# Patient Record
Sex: MALE | Race: BLACK OR AFRICAN AMERICAN | NOT HISPANIC OR LATINO | Employment: OTHER | URBAN - METROPOLITAN AREA
[De-identification: names, ages, dates, MRNs, and addresses within clinical notes are randomized per-mention and may not be internally consistent; named-entity substitution may affect disease eponyms.]

---

## 2019-01-01 ENCOUNTER — OUTSIDE PLACE OF SERVICE (OUTPATIENT)
Dept: ADMINISTRATIVE | Facility: HOSPITAL | Age: 84
End: 2019-01-01
Payer: MEDICARE

## 2019-01-01 ENCOUNTER — HOSPITAL ENCOUNTER (INPATIENT)
Facility: HOSPITAL | Age: 84
LOS: 5 days | Discharge: NURSING, REHAB OR LONG-TERM CARE | DRG: 682 | End: 2019-11-06
Attending: EMERGENCY MEDICINE | Admitting: INTERNAL MEDICINE
Payer: MEDICARE

## 2019-01-01 VITALS
DIASTOLIC BLOOD PRESSURE: 42 MMHG | SYSTOLIC BLOOD PRESSURE: 176 MMHG | SYSTOLIC BLOOD PRESSURE: 113 MMHG | HEART RATE: 77 BPM | HEART RATE: 88 BPM | DIASTOLIC BLOOD PRESSURE: 67 MMHG | RESPIRATION RATE: 14 BRPM | OXYGEN SATURATION: 97 %

## 2019-01-01 VITALS
RESPIRATION RATE: 16 BRPM | HEART RATE: 61 BPM | SYSTOLIC BLOOD PRESSURE: 153 MMHG | RESPIRATION RATE: 14 BRPM | HEART RATE: 87 BPM | HEART RATE: 68 BPM | OXYGEN SATURATION: 98 % | TEMPERATURE: 98 F | DIASTOLIC BLOOD PRESSURE: 55 MMHG | RESPIRATION RATE: 16 BRPM | TEMPERATURE: 98 F | HEART RATE: 72 BPM | SYSTOLIC BLOOD PRESSURE: 153 MMHG | RESPIRATION RATE: 17 BRPM | DIASTOLIC BLOOD PRESSURE: 59 MMHG | OXYGEN SATURATION: 99 % | HEART RATE: 95 BPM | TEMPERATURE: 98 F | OXYGEN SATURATION: 100 % | DIASTOLIC BLOOD PRESSURE: 59 MMHG | SYSTOLIC BLOOD PRESSURE: 140 MMHG | DIASTOLIC BLOOD PRESSURE: 53 MMHG | SYSTOLIC BLOOD PRESSURE: 140 MMHG | TEMPERATURE: 98 F | OXYGEN SATURATION: 100 % | SYSTOLIC BLOOD PRESSURE: 142 MMHG | DIASTOLIC BLOOD PRESSURE: 39 MMHG | OXYGEN SATURATION: 98 % | RESPIRATION RATE: 15 BRPM

## 2019-01-01 VITALS
RESPIRATION RATE: 19 BRPM | SYSTOLIC BLOOD PRESSURE: 122 MMHG | HEART RATE: 58 BPM | DIASTOLIC BLOOD PRESSURE: 51 MMHG | SYSTOLIC BLOOD PRESSURE: 116 MMHG | TEMPERATURE: 98 F | TEMPERATURE: 98 F | SYSTOLIC BLOOD PRESSURE: 167 MMHG | HEART RATE: 102 BPM | OXYGEN SATURATION: 100 % | OXYGEN SATURATION: 94 % | RESPIRATION RATE: 20 BRPM | DIASTOLIC BLOOD PRESSURE: 59 MMHG | RESPIRATION RATE: 15 BRPM | SYSTOLIC BLOOD PRESSURE: 132 MMHG | HEART RATE: 85 BPM | OXYGEN SATURATION: 87 % | RESPIRATION RATE: 18 BRPM | HEART RATE: 88 BPM | RESPIRATION RATE: 16 BRPM | SYSTOLIC BLOOD PRESSURE: 156 MMHG | TEMPERATURE: 98 F | DIASTOLIC BLOOD PRESSURE: 48 MMHG | TEMPERATURE: 98 F | DIASTOLIC BLOOD PRESSURE: 58 MMHG | HEART RATE: 118 BPM | DIASTOLIC BLOOD PRESSURE: 67 MMHG | OXYGEN SATURATION: 94 % | TEMPERATURE: 98 F

## 2019-01-01 VITALS
TEMPERATURE: 98 F | HEART RATE: 75 BPM | SYSTOLIC BLOOD PRESSURE: 131 MMHG | RESPIRATION RATE: 17 BRPM | DIASTOLIC BLOOD PRESSURE: 50 MMHG | DIASTOLIC BLOOD PRESSURE: 35 MMHG | HEART RATE: 73 BPM | HEART RATE: 88 BPM | SYSTOLIC BLOOD PRESSURE: 105 MMHG | OXYGEN SATURATION: 100 % | DIASTOLIC BLOOD PRESSURE: 47 MMHG | TEMPERATURE: 98 F | DIASTOLIC BLOOD PRESSURE: 61 MMHG | HEART RATE: 103 BPM | OXYGEN SATURATION: 100 % | OXYGEN SATURATION: 96 % | RESPIRATION RATE: 18 BRPM | SYSTOLIC BLOOD PRESSURE: 115 MMHG | SYSTOLIC BLOOD PRESSURE: 156 MMHG

## 2019-01-01 VITALS
OXYGEN SATURATION: 100 % | TEMPERATURE: 98 F | HEART RATE: 70 BPM | DIASTOLIC BLOOD PRESSURE: 42 MMHG | RESPIRATION RATE: 15 BRPM | OXYGEN SATURATION: 100 % | SYSTOLIC BLOOD PRESSURE: 118 MMHG | DIASTOLIC BLOOD PRESSURE: 42 MMHG | TEMPERATURE: 98 F | HEART RATE: 82 BPM | SYSTOLIC BLOOD PRESSURE: 116 MMHG | RESPIRATION RATE: 13 BRPM | TEMPERATURE: 98 F | OXYGEN SATURATION: 97 % | SYSTOLIC BLOOD PRESSURE: 133 MMHG | DIASTOLIC BLOOD PRESSURE: 46 MMHG | RESPIRATION RATE: 16 BRPM | HEART RATE: 86 BPM

## 2019-01-01 VITALS
TEMPERATURE: 98 F | RESPIRATION RATE: 17 BRPM | DIASTOLIC BLOOD PRESSURE: 53 MMHG | HEART RATE: 88 BPM | SYSTOLIC BLOOD PRESSURE: 150 MMHG | OXYGEN SATURATION: 99 %

## 2019-01-01 VITALS
OXYGEN SATURATION: 90 % | HEART RATE: 97 BPM | RESPIRATION RATE: 18 BRPM | TEMPERATURE: 98 F | DIASTOLIC BLOOD PRESSURE: 67 MMHG | BODY MASS INDEX: 25.05 KG/M2 | DIASTOLIC BLOOD PRESSURE: 54 MMHG | SYSTOLIC BLOOD PRESSURE: 115 MMHG | SYSTOLIC BLOOD PRESSURE: 145 MMHG | HEART RATE: 93 BPM | RESPIRATION RATE: 13 BRPM | TEMPERATURE: 98 F | HEIGHT: 72 IN | OXYGEN SATURATION: 100 % | WEIGHT: 184.94 LBS

## 2019-01-01 DIAGNOSIS — D63.1 ANEMIA DUE TO CHRONIC KIDNEY DISEASE, ON CHRONIC DIALYSIS: ICD-10-CM

## 2019-01-01 DIAGNOSIS — Z99.2 ANEMIA DUE TO CHRONIC KIDNEY DISEASE, ON CHRONIC DIALYSIS: ICD-10-CM

## 2019-01-01 DIAGNOSIS — Z99.2 ADMISSION FOR DIALYSIS: ICD-10-CM

## 2019-01-01 DIAGNOSIS — D64.9 ANEMIA: ICD-10-CM

## 2019-01-01 DIAGNOSIS — E87.5 HYPERKALEMIA: Primary | ICD-10-CM

## 2019-01-01 DIAGNOSIS — D64.9 SYMPTOMATIC ANEMIA: ICD-10-CM

## 2019-01-01 DIAGNOSIS — N18.6 ANEMIA DUE TO CHRONIC KIDNEY DISEASE, ON CHRONIC DIALYSIS: ICD-10-CM

## 2019-01-01 DIAGNOSIS — S72.92XA FEMUR FRACTURE, LEFT: ICD-10-CM

## 2019-01-01 DIAGNOSIS — R53.81 MALAISE: ICD-10-CM

## 2019-01-01 LAB
ABO + RH BLD: NORMAL
ALBUMIN SERPL BCP-MCNC: 2.4 G/DL (ref 3.5–5.2)
ALBUMIN SERPL BCP-MCNC: 2.5 G/DL (ref 3.5–5.2)
ALBUMIN SERPL BCP-MCNC: 2.5 G/DL (ref 3.5–5.2)
ALBUMIN SERPL BCP-MCNC: 2.6 G/DL (ref 3.5–5.2)
ALBUMIN SERPL BCP-MCNC: 2.6 G/DL (ref 3.5–5.2)
ALBUMIN SERPL BCP-MCNC: 2.9 G/DL (ref 3.5–5.2)
ALP SERPL-CCNC: 69 U/L (ref 55–135)
ALP SERPL-CCNC: 72 U/L (ref 55–135)
ALP SERPL-CCNC: 72 U/L (ref 55–135)
ALP SERPL-CCNC: 75 U/L (ref 55–135)
ALP SERPL-CCNC: 77 U/L (ref 55–135)
ALP SERPL-CCNC: 86 U/L (ref 55–135)
ALT SERPL W/O P-5'-P-CCNC: 10 U/L (ref 10–44)
ALT SERPL W/O P-5'-P-CCNC: 11 U/L (ref 10–44)
ALT SERPL W/O P-5'-P-CCNC: 11 U/L (ref 10–44)
ALT SERPL W/O P-5'-P-CCNC: 9 U/L (ref 10–44)
AMPHET+METHAMPHET UR QL: NEGATIVE
AMYLASE SERPL-CCNC: 98 U/L (ref 20–110)
ANION GAP SERPL CALC-SCNC: 10 MMOL/L (ref 8–16)
ANION GAP SERPL CALC-SCNC: 10 MMOL/L (ref 8–16)
ANION GAP SERPL CALC-SCNC: 11 MMOL/L (ref 8–16)
ANION GAP SERPL CALC-SCNC: 12 MMOL/L (ref 8–16)
ANION GAP SERPL CALC-SCNC: 13 MMOL/L (ref 8–16)
ANION GAP SERPL CALC-SCNC: 9 MMOL/L (ref 8–16)
AST SERPL-CCNC: 16 U/L (ref 10–40)
AST SERPL-CCNC: 17 U/L (ref 10–40)
AST SERPL-CCNC: 18 U/L (ref 10–40)
AST SERPL-CCNC: 18 U/L (ref 10–40)
AST SERPL-CCNC: 21 U/L (ref 10–40)
AST SERPL-CCNC: 22 U/L (ref 10–40)
BACTERIA #/AREA URNS HPF: NEGATIVE /HPF
BACTERIA BLD CULT: NORMAL
BARBITURATES UR QL SCN>200 NG/ML: NEGATIVE
BASOPHILS # BLD AUTO: 0.02 K/UL (ref 0–0.2)
BASOPHILS # BLD AUTO: 0.02 K/UL (ref 0–0.2)
BASOPHILS # BLD AUTO: 0.04 K/UL (ref 0–0.2)
BASOPHILS # BLD AUTO: 0.04 K/UL (ref 0–0.2)
BASOPHILS # BLD AUTO: 0.05 K/UL (ref 0–0.2)
BASOPHILS # BLD AUTO: 0.06 K/UL (ref 0–0.2)
BASOPHILS NFR BLD: 0.1 % (ref 0–1.9)
BASOPHILS NFR BLD: 0.2 % (ref 0–1.9)
BASOPHILS NFR BLD: 0.3 % (ref 0–1.9)
BASOPHILS NFR BLD: 0.3 % (ref 0–1.9)
BASOPHILS NFR BLD: 0.4 % (ref 0–1.9)
BASOPHILS NFR BLD: 0.5 % (ref 0–1.9)
BENZODIAZ UR QL SCN>200 NG/ML: NEGATIVE
BILIRUB SERPL-MCNC: 0.7 MG/DL (ref 0.1–1)
BILIRUB SERPL-MCNC: 0.8 MG/DL (ref 0.1–1)
BILIRUB SERPL-MCNC: 0.8 MG/DL (ref 0.1–1)
BILIRUB SERPL-MCNC: 0.9 MG/DL (ref 0.1–1)
BILIRUB SERPL-MCNC: 1.1 MG/DL (ref 0.1–1)
BILIRUB SERPL-MCNC: 1.2 MG/DL (ref 0.1–1)
BILIRUB UR QL STRIP: NEGATIVE
BLD GP AB SCN CELLS X3 SERPL QL: NORMAL
BLD PROD TYP BPU: NORMAL
BLD PROD TYP BPU: NORMAL
BLOOD UNIT EXPIRATION DATE: NORMAL
BLOOD UNIT EXPIRATION DATE: NORMAL
BLOOD UNIT TYPE CODE: 6200
BLOOD UNIT TYPE CODE: 6200
BLOOD UNIT TYPE: NORMAL
BLOOD UNIT TYPE: NORMAL
BNP SERPL-MCNC: 335 PG/ML (ref 0–99)
BUN SERPL-MCNC: 40 MG/DL (ref 10–30)
BUN SERPL-MCNC: 41 MG/DL (ref 10–30)
BUN SERPL-MCNC: 50 MG/DL (ref 10–30)
BUN SERPL-MCNC: 50 MG/DL (ref 10–30)
BUN SERPL-MCNC: 62 MG/DL (ref 10–30)
BUN SERPL-MCNC: 63 MG/DL (ref 10–30)
BZE UR QL SCN: NEGATIVE
CALCIUM SERPL-MCNC: 10.1 MG/DL (ref 8.7–10.5)
CALCIUM SERPL-MCNC: 10.2 MG/DL (ref 8.7–10.5)
CALCIUM SERPL-MCNC: 9.4 MG/DL (ref 8.7–10.5)
CALCIUM SERPL-MCNC: 9.7 MG/DL (ref 8.7–10.5)
CANNABINOIDS UR QL SCN: NEGATIVE
CHLORIDE SERPL-SCNC: 101 MMOL/L (ref 95–110)
CHLORIDE SERPL-SCNC: 93 MMOL/L (ref 95–110)
CHLORIDE SERPL-SCNC: 97 MMOL/L (ref 95–110)
CHLORIDE SERPL-SCNC: 97 MMOL/L (ref 95–110)
CHLORIDE SERPL-SCNC: 99 MMOL/L (ref 95–110)
CHLORIDE SERPL-SCNC: 99 MMOL/L (ref 95–110)
CHOLEST SERPL-MCNC: 169 MG/DL (ref 120–199)
CHOLEST/HDLC SERPL: 4.8 {RATIO} (ref 2–5)
CK MB SERPL-MCNC: 3.1 NG/ML (ref 0.1–6.5)
CK SERPL-CCNC: 83 U/L (ref 20–200)
CLARITY UR: CLEAR
CO2 SERPL-SCNC: 25 MMOL/L (ref 23–29)
CO2 SERPL-SCNC: 27 MMOL/L (ref 23–29)
CO2 SERPL-SCNC: 27 MMOL/L (ref 23–29)
CO2 SERPL-SCNC: 29 MMOL/L (ref 23–29)
CO2 SERPL-SCNC: 30 MMOL/L (ref 23–29)
CO2 SERPL-SCNC: 30 MMOL/L (ref 23–29)
CODING SYSTEM: NORMAL
CODING SYSTEM: NORMAL
COLOR UR: YELLOW
CREAT SERPL-MCNC: 6 MG/DL (ref 0.5–1.4)
CREAT SERPL-MCNC: 6.5 MG/DL (ref 0.5–1.4)
CREAT SERPL-MCNC: 7.3 MG/DL (ref 0.5–1.4)
CREAT SERPL-MCNC: 7.3 MG/DL (ref 0.5–1.4)
CREAT SERPL-MCNC: 8.4 MG/DL (ref 0.5–1.4)
CREAT SERPL-MCNC: 9 MG/DL (ref 0.5–1.4)
CREAT UR-MCNC: 70 MG/DL (ref 23–375)
DIFFERENTIAL METHOD: ABNORMAL
DISPENSE STATUS: NORMAL
DISPENSE STATUS: NORMAL
EOSINOPHIL # BLD AUTO: 0 K/UL (ref 0–0.5)
EOSINOPHIL # BLD AUTO: 0.2 K/UL (ref 0–0.5)
EOSINOPHIL # BLD AUTO: 0.2 K/UL (ref 0–0.5)
EOSINOPHIL # BLD AUTO: 0.3 K/UL (ref 0–0.5)
EOSINOPHIL # BLD AUTO: 0.3 K/UL (ref 0–0.5)
EOSINOPHIL # BLD AUTO: 0.4 K/UL (ref 0–0.5)
EOSINOPHIL NFR BLD: 0.2 % (ref 0–8)
EOSINOPHIL NFR BLD: 1.9 % (ref 0–8)
EOSINOPHIL NFR BLD: 2.4 % (ref 0–8)
EOSINOPHIL NFR BLD: 2.6 % (ref 0–8)
EOSINOPHIL NFR BLD: 2.7 % (ref 0–8)
EOSINOPHIL NFR BLD: 3 % (ref 0–8)
ERYTHROCYTE [DISTWIDTH] IN BLOOD BY AUTOMATED COUNT: 19.2 % (ref 11.5–14.5)
ERYTHROCYTE [DISTWIDTH] IN BLOOD BY AUTOMATED COUNT: 20 % (ref 11.5–14.5)
ERYTHROCYTE [DISTWIDTH] IN BLOOD BY AUTOMATED COUNT: 20.3 % (ref 11.5–14.5)
ERYTHROCYTE [DISTWIDTH] IN BLOOD BY AUTOMATED COUNT: 20.9 % (ref 11.5–14.5)
ERYTHROCYTE [DISTWIDTH] IN BLOOD BY AUTOMATED COUNT: 21.7 % (ref 11.5–14.5)
ERYTHROCYTE [DISTWIDTH] IN BLOOD BY AUTOMATED COUNT: 22.9 % (ref 11.5–14.5)
EST. GFR  (AFRICAN AMERICAN): 5.3 ML/MIN/1.73 M^2
EST. GFR  (AFRICAN AMERICAN): 5.7 ML/MIN/1.73 M^2
EST. GFR  (AFRICAN AMERICAN): 6.8 ML/MIN/1.73 M^2
EST. GFR  (AFRICAN AMERICAN): 6.8 ML/MIN/1.73 M^2
EST. GFR  (AFRICAN AMERICAN): 7.8 ML/MIN/1.73 M^2
EST. GFR  (AFRICAN AMERICAN): 8.6 ML/MIN/1.73 M^2
EST. GFR  (NON AFRICAN AMERICAN): 4.6 ML/MIN/1.73 M^2
EST. GFR  (NON AFRICAN AMERICAN): 5 ML/MIN/1.73 M^2
EST. GFR  (NON AFRICAN AMERICAN): 5.9 ML/MIN/1.73 M^2
EST. GFR  (NON AFRICAN AMERICAN): 5.9 ML/MIN/1.73 M^2
EST. GFR  (NON AFRICAN AMERICAN): 6.8 ML/MIN/1.73 M^2
EST. GFR  (NON AFRICAN AMERICAN): 7.5 ML/MIN/1.73 M^2
GLUCOSE SERPL-MCNC: 102 MG/DL (ref 70–110)
GLUCOSE SERPL-MCNC: 102 MG/DL (ref 70–110)
GLUCOSE SERPL-MCNC: 103 MG/DL (ref 70–110)
GLUCOSE SERPL-MCNC: 107 MG/DL (ref 70–110)
GLUCOSE SERPL-MCNC: 114 MG/DL (ref 70–110)
GLUCOSE SERPL-MCNC: 92 MG/DL (ref 70–110)
GLUCOSE UR QL STRIP: NEGATIVE
HCT VFR BLD AUTO: 20.8 % (ref 40–54)
HCT VFR BLD AUTO: 27.1 % (ref 40–54)
HCT VFR BLD AUTO: 27.5 % (ref 40–54)
HCT VFR BLD AUTO: 29.3 % (ref 40–54)
HCT VFR BLD AUTO: 29.5 % (ref 40–54)
HCT VFR BLD AUTO: 33.5 % (ref 40–54)
HDLC SERPL-MCNC: 35 MG/DL (ref 40–75)
HDLC SERPL: 20.7 % (ref 20–50)
HGB BLD-MCNC: 10.4 G/DL (ref 14–18)
HGB BLD-MCNC: 6.2 G/DL (ref 14–18)
HGB BLD-MCNC: 8.4 G/DL (ref 14–18)
HGB BLD-MCNC: 8.4 G/DL (ref 14–18)
HGB BLD-MCNC: 9 G/DL (ref 14–18)
HGB BLD-MCNC: 9.1 G/DL (ref 14–18)
HGB UR QL STRIP: ABNORMAL
HYALINE CASTS #/AREA URNS LPF: 3 /LPF
IMM GRANULOCYTES # BLD AUTO: 0.05 K/UL (ref 0–0.04)
IMM GRANULOCYTES # BLD AUTO: 0.06 K/UL (ref 0–0.04)
IMM GRANULOCYTES # BLD AUTO: 0.07 K/UL (ref 0–0.04)
IMM GRANULOCYTES # BLD AUTO: 0.1 K/UL (ref 0–0.04)
IMM GRANULOCYTES NFR BLD AUTO: 0.4 % (ref 0–0.5)
IMM GRANULOCYTES NFR BLD AUTO: 0.5 % (ref 0–0.5)
IMM GRANULOCYTES NFR BLD AUTO: 0.6 % (ref 0–0.5)
IMM GRANULOCYTES NFR BLD AUTO: 0.7 % (ref 0–0.5)
KETONES UR QL STRIP: NEGATIVE
LDLC SERPL CALC-MCNC: 100.8 MG/DL (ref 63–159)
LEUKOCYTE ESTERASE UR QL STRIP: NEGATIVE
LIPASE SERPL-CCNC: 27 U/L (ref 4–60)
LYMPHOCYTES # BLD AUTO: 1.2 K/UL (ref 1–4.8)
LYMPHOCYTES # BLD AUTO: 1.4 K/UL (ref 1–4.8)
LYMPHOCYTES # BLD AUTO: 1.4 K/UL (ref 1–4.8)
LYMPHOCYTES # BLD AUTO: 1.5 K/UL (ref 1–4.8)
LYMPHOCYTES # BLD AUTO: 1.6 K/UL (ref 1–4.8)
LYMPHOCYTES # BLD AUTO: 1.8 K/UL (ref 1–4.8)
LYMPHOCYTES NFR BLD: 11.2 % (ref 18–48)
LYMPHOCYTES NFR BLD: 11.9 % (ref 18–48)
LYMPHOCYTES NFR BLD: 12.1 % (ref 18–48)
LYMPHOCYTES NFR BLD: 14.8 % (ref 18–48)
LYMPHOCYTES NFR BLD: 14.8 % (ref 18–48)
LYMPHOCYTES NFR BLD: 8.4 % (ref 18–48)
MAGNESIUM SERPL-MCNC: 2.1 MG/DL (ref 1.6–2.6)
MAGNESIUM SERPL-MCNC: 2.2 MG/DL (ref 1.6–2.6)
MAGNESIUM SERPL-MCNC: 2.4 MG/DL (ref 1.6–2.6)
MAGNESIUM SERPL-MCNC: 2.6 MG/DL (ref 1.6–2.6)
MCH RBC QN AUTO: 31.6 PG (ref 27–31)
MCH RBC QN AUTO: 31.9 PG (ref 27–31)
MCH RBC QN AUTO: 32 PG (ref 27–31)
MCH RBC QN AUTO: 32.2 PG (ref 27–31)
MCH RBC QN AUTO: 32.2 PG (ref 27–31)
MCH RBC QN AUTO: 33.5 PG (ref 27–31)
MCHC RBC AUTO-ENTMCNC: 29.8 G/DL (ref 32–36)
MCHC RBC AUTO-ENTMCNC: 30.5 G/DL (ref 32–36)
MCHC RBC AUTO-ENTMCNC: 30.7 G/DL (ref 32–36)
MCHC RBC AUTO-ENTMCNC: 30.8 G/DL (ref 32–36)
MCHC RBC AUTO-ENTMCNC: 31 G/DL (ref 32–36)
MCHC RBC AUTO-ENTMCNC: 31 G/DL (ref 32–36)
MCV RBC AUTO: 103 FL (ref 82–98)
MCV RBC AUTO: 104 FL (ref 82–98)
MCV RBC AUTO: 112 FL (ref 82–98)
MICROSCOPIC COMMENT: ABNORMAL
MONOCYTES # BLD AUTO: 0.6 K/UL (ref 0.3–1)
MONOCYTES # BLD AUTO: 1 K/UL (ref 0.3–1)
MONOCYTES # BLD AUTO: 1.1 K/UL (ref 0.3–1)
MONOCYTES # BLD AUTO: 1.1 K/UL (ref 0.3–1)
MONOCYTES # BLD AUTO: 1.3 K/UL (ref 0.3–1)
MONOCYTES # BLD AUTO: 1.3 K/UL (ref 0.3–1)
MONOCYTES NFR BLD: 10.8 % (ref 4–15)
MONOCYTES NFR BLD: 11 % (ref 4–15)
MONOCYTES NFR BLD: 6.9 % (ref 4–15)
MONOCYTES NFR BLD: 7.4 % (ref 4–15)
MONOCYTES NFR BLD: 8.2 % (ref 4–15)
MONOCYTES NFR BLD: 8.2 % (ref 4–15)
NEUTROPHILS # BLD AUTO: 10.5 K/UL (ref 1.8–7.7)
NEUTROPHILS # BLD AUTO: 13.6 K/UL (ref 1.8–7.7)
NEUTROPHILS # BLD AUTO: 6.1 K/UL (ref 1.8–7.7)
NEUTROPHILS # BLD AUTO: 8.4 K/UL (ref 1.8–7.7)
NEUTROPHILS # BLD AUTO: 9.1 K/UL (ref 1.8–7.7)
NEUTROPHILS # BLD AUTO: 9.2 K/UL (ref 1.8–7.7)
NEUTROPHILS NFR BLD: 71.3 % (ref 38–73)
NEUTROPHILS NFR BLD: 74.2 % (ref 38–73)
NEUTROPHILS NFR BLD: 75 % (ref 38–73)
NEUTROPHILS NFR BLD: 76 % (ref 38–73)
NEUTROPHILS NFR BLD: 76 % (ref 38–73)
NEUTROPHILS NFR BLD: 83.8 % (ref 38–73)
NITRITE UR QL STRIP: NEGATIVE
NONHDLC SERPL-MCNC: 134 MG/DL
NRBC BLD-RTO: 0 /100 WBC
NUM UNITS TRANS PACKED RBC: NORMAL
NUM UNITS TRANS PACKED RBC: NORMAL
OPIATES UR QL SCN: NORMAL
PCP UR QL SCN>25 NG/ML: NEGATIVE
PH UR STRIP: 8 [PH] (ref 5–8)
PHOSPHATE SERPL-MCNC: 4.1 MG/DL (ref 2.7–4.5)
PHOSPHATE SERPL-MCNC: 4.1 MG/DL (ref 2.7–4.5)
PHOSPHATE SERPL-MCNC: 4.2 MG/DL (ref 2.7–4.5)
PHOSPHATE SERPL-MCNC: 4.5 MG/DL (ref 2.7–4.5)
PHOSPHATE SERPL-MCNC: 4.5 MG/DL (ref 2.7–4.5)
PLATELET # BLD AUTO: 307 K/UL (ref 150–350)
PLATELET # BLD AUTO: 319 K/UL (ref 150–350)
PLATELET # BLD AUTO: 324 K/UL (ref 150–350)
PLATELET # BLD AUTO: 337 K/UL (ref 150–350)
PLATELET # BLD AUTO: 390 K/UL (ref 150–350)
PLATELET # BLD AUTO: 492 K/UL (ref 150–350)
PMV BLD AUTO: 8.8 FL (ref 9.2–12.9)
PMV BLD AUTO: 9 FL (ref 9.2–12.9)
PMV BLD AUTO: 9.3 FL (ref 9.2–12.9)
PMV BLD AUTO: 9.5 FL (ref 9.2–12.9)
POTASSIUM SERPL-SCNC: 4.6 MMOL/L (ref 3.5–5.1)
POTASSIUM SERPL-SCNC: 4.6 MMOL/L (ref 3.5–5.1)
POTASSIUM SERPL-SCNC: 4.7 MMOL/L (ref 3.5–5.1)
POTASSIUM SERPL-SCNC: 4.9 MMOL/L (ref 3.5–5.1)
POTASSIUM SERPL-SCNC: 5.1 MMOL/L (ref 3.5–5.1)
POTASSIUM SERPL-SCNC: 5.8 MMOL/L (ref 3.5–5.1)
PROT SERPL-MCNC: 6.4 G/DL (ref 6–8.4)
PROT SERPL-MCNC: 6.5 G/DL (ref 6–8.4)
PROT SERPL-MCNC: 6.6 G/DL (ref 6–8.4)
PROT SERPL-MCNC: 6.7 G/DL (ref 6–8.4)
PROT SERPL-MCNC: 6.7 G/DL (ref 6–8.4)
PROT SERPL-MCNC: 7.5 G/DL (ref 6–8.4)
PROT UR QL STRIP: ABNORMAL
RBC # BLD AUTO: 1.85 M/UL (ref 4.6–6.2)
RBC # BLD AUTO: 2.61 M/UL (ref 4.6–6.2)
RBC # BLD AUTO: 2.66 M/UL (ref 4.6–6.2)
RBC # BLD AUTO: 2.81 M/UL (ref 4.6–6.2)
RBC # BLD AUTO: 2.85 M/UL (ref 4.6–6.2)
RBC # BLD AUTO: 3.23 M/UL (ref 4.6–6.2)
RBC #/AREA URNS HPF: 1 /HPF (ref 0–4)
SODIUM SERPL-SCNC: 131 MMOL/L (ref 136–145)
SODIUM SERPL-SCNC: 136 MMOL/L (ref 136–145)
SODIUM SERPL-SCNC: 137 MMOL/L (ref 136–145)
SODIUM SERPL-SCNC: 137 MMOL/L (ref 136–145)
SODIUM SERPL-SCNC: 139 MMOL/L (ref 136–145)
SODIUM SERPL-SCNC: 139 MMOL/L (ref 136–145)
SP GR UR STRIP: 1 (ref 1–1.03)
SQUAMOUS #/AREA URNS HPF: 1 /HPF
TOXICOLOGY INFORMATION: NORMAL
TRIGL SERPL-MCNC: 166 MG/DL (ref 30–150)
TROPONIN I SERPL DL<=0.01 NG/ML-MCNC: 0.08 NG/ML (ref 0.02–0.04)
TROPONIN I SERPL DL<=0.01 NG/ML-MCNC: 0.09 NG/ML (ref 0.02–0.04)
TSH SERPL DL<=0.005 MIU/L-ACNC: 2.21 UIU/ML (ref 0.34–5.6)
URN SPEC COLLECT METH UR: ABNORMAL
UROBILINOGEN UR STRIP-ACNC: NEGATIVE EU/DL
WBC # BLD AUTO: 11.79 K/UL (ref 3.9–12.7)
WBC # BLD AUTO: 12.02 K/UL (ref 3.9–12.7)
WBC # BLD AUTO: 12.18 K/UL (ref 3.9–12.7)
WBC # BLD AUTO: 13.83 K/UL (ref 3.9–12.7)
WBC # BLD AUTO: 16.19 K/UL (ref 3.9–12.7)
WBC # BLD AUTO: 8.19 K/UL (ref 3.9–12.7)
WBC #/AREA URNS HPF: 2 /HPF (ref 0–5)

## 2019-01-01 PROCEDURE — 94640 AIRWAY INHALATION TREATMENT: CPT

## 2019-01-01 PROCEDURE — 94760 N-INVAS EAR/PLS OXIMETRY 1: CPT

## 2019-01-01 PROCEDURE — 80053 COMPREHEN METABOLIC PANEL: CPT

## 2019-01-01 PROCEDURE — 83690 ASSAY OF LIPASE: CPT

## 2019-01-01 PROCEDURE — 94761 N-INVAS EAR/PLS OXIMETRY MLT: CPT

## 2019-01-01 PROCEDURE — 94664 DEMO&/EVAL PT USE INHALER: CPT

## 2019-01-01 PROCEDURE — 97530 THERAPEUTIC ACTIVITIES: CPT

## 2019-01-01 PROCEDURE — 63600175 PHARM REV CODE 636 W HCPCS: Performed by: INTERNAL MEDICINE

## 2019-01-01 PROCEDURE — 83735 ASSAY OF MAGNESIUM: CPT

## 2019-01-01 PROCEDURE — 25000003 PHARM REV CODE 250: Performed by: INTERNAL MEDICINE

## 2019-01-01 PROCEDURE — 81001 URINALYSIS AUTO W/SCOPE: CPT

## 2019-01-01 PROCEDURE — 25000242 PHARM REV CODE 250 ALT 637 W/ HCPCS: Performed by: EMERGENCY MEDICINE

## 2019-01-01 PROCEDURE — 96365 THER/PROPH/DIAG IV INF INIT: CPT

## 2019-01-01 PROCEDURE — 63600175 PHARM REV CODE 636 W HCPCS: Performed by: EMERGENCY MEDICINE

## 2019-01-01 PROCEDURE — 87040 BLOOD CULTURE FOR BACTERIA: CPT

## 2019-01-01 PROCEDURE — 80061 LIPID PANEL: CPT

## 2019-01-01 PROCEDURE — 36415 COLL VENOUS BLD VENIPUNCTURE: CPT

## 2019-01-01 PROCEDURE — 85025 COMPLETE CBC W/AUTO DIFF WBC: CPT

## 2019-01-01 PROCEDURE — 36430 TRANSFUSION BLD/BLD COMPNT: CPT

## 2019-01-01 PROCEDURE — 80307 DRUG TEST PRSMV CHEM ANLYZR: CPT

## 2019-01-01 PROCEDURE — 25000003 PHARM REV CODE 250: Performed by: NURSE PRACTITIONER

## 2019-01-01 PROCEDURE — P9016 RBC LEUKOCYTES REDUCED: HCPCS

## 2019-01-01 PROCEDURE — 25000003 PHARM REV CODE 250: Performed by: EMERGENCY MEDICINE

## 2019-01-01 PROCEDURE — 84443 ASSAY THYROID STIM HORMONE: CPT

## 2019-01-01 PROCEDURE — 82553 CREATINE MB FRACTION: CPT

## 2019-01-01 PROCEDURE — 82150 ASSAY OF AMYLASE: CPT

## 2019-01-01 PROCEDURE — 97110 THERAPEUTIC EXERCISES: CPT

## 2019-01-01 PROCEDURE — 84100 ASSAY OF PHOSPHORUS: CPT

## 2019-01-01 PROCEDURE — 90935 HEMODIALYSIS ONE EVALUATION: CPT

## 2019-01-01 PROCEDURE — 83880 ASSAY OF NATRIURETIC PEPTIDE: CPT

## 2019-01-01 PROCEDURE — 99900035 HC TECH TIME PER 15 MIN (STAT)

## 2019-01-01 PROCEDURE — 12000002 HC ACUTE/MED SURGE SEMI-PRIVATE ROOM

## 2019-01-01 PROCEDURE — 82550 ASSAY OF CK (CPK): CPT

## 2019-01-01 PROCEDURE — 97163 PT EVAL HIGH COMPLEX 45 MIN: CPT

## 2019-01-01 PROCEDURE — 96375 TX/PRO/DX INJ NEW DRUG ADDON: CPT

## 2019-01-01 PROCEDURE — 86920 COMPATIBILITY TEST SPIN: CPT

## 2019-01-01 PROCEDURE — 63600175 PHARM REV CODE 636 W HCPCS: Performed by: NURSE PRACTITIONER

## 2019-01-01 PROCEDURE — 99306 1ST NF CARE HIGH MDM 50: CPT | Mod: ,,, | Performed by: FAMILY MEDICINE

## 2019-01-01 PROCEDURE — 99306 PR NURSING FACILITY CARE, INIT, HIGH SEVERITY: ICD-10-PCS | Mod: ,,, | Performed by: FAMILY MEDICINE

## 2019-01-01 PROCEDURE — 97166 OT EVAL MOD COMPLEX 45 MIN: CPT

## 2019-01-01 PROCEDURE — 99285 EMERGENCY DEPT VISIT HI MDM: CPT | Mod: 25

## 2019-01-01 PROCEDURE — 86850 RBC ANTIBODY SCREEN: CPT

## 2019-01-01 PROCEDURE — 93005 ELECTROCARDIOGRAM TRACING: CPT

## 2019-01-01 PROCEDURE — 25000003 PHARM REV CODE 250: Performed by: STUDENT IN AN ORGANIZED HEALTH CARE EDUCATION/TRAINING PROGRAM

## 2019-01-01 PROCEDURE — 84484 ASSAY OF TROPONIN QUANT: CPT

## 2019-01-01 RX ORDER — OXYCODONE AND ACETAMINOPHEN 10; 325 MG/1; MG/1
1 TABLET ORAL
Status: DISCONTINUED | OUTPATIENT
Start: 2019-01-01 | End: 2019-01-01 | Stop reason: HOSPADM

## 2019-01-01 RX ORDER — CEPHALEXIN 250 MG/1
500 CAPSULE ORAL EVERY 12 HOURS
Status: DISCONTINUED | OUTPATIENT
Start: 2019-01-01 | End: 2019-01-01 | Stop reason: HOSPADM

## 2019-01-01 RX ORDER — KETOROLAC TROMETHAMINE 30 MG/ML
15 INJECTION, SOLUTION INTRAMUSCULAR; INTRAVENOUS ONCE
Status: COMPLETED | OUTPATIENT
Start: 2019-01-01 | End: 2019-01-01

## 2019-01-01 RX ORDER — HEPARIN SODIUM 5000 [USP'U]/ML
5000 INJECTION, SOLUTION INTRAVENOUS; SUBCUTANEOUS EVERY 8 HOURS
Status: DISCONTINUED | OUTPATIENT
Start: 2019-01-01 | End: 2019-01-01 | Stop reason: HOSPADM

## 2019-01-01 RX ORDER — KETOROLAC TROMETHAMINE 30 MG/ML
15 INJECTION, SOLUTION INTRAMUSCULAR; INTRAVENOUS ONCE
Status: DISCONTINUED | OUTPATIENT
Start: 2019-01-01 | End: 2019-01-01

## 2019-01-01 RX ORDER — L. ACIDOPHILUS/L.BULGARICUS 100MM CELL
1 GRANULES IN PACKET (EA) ORAL DAILY
Status: DISCONTINUED | OUTPATIENT
Start: 2019-01-01 | End: 2019-01-01 | Stop reason: HOSPADM

## 2019-01-01 RX ORDER — OXYCODONE AND ACETAMINOPHEN 10; 325 MG/1; MG/1
1 TABLET ORAL
COMMUNITY

## 2019-01-01 RX ORDER — HYDROCODONE BITARTRATE AND ACETAMINOPHEN 500; 5 MG/1; MG/1
TABLET ORAL
Status: DISCONTINUED | OUTPATIENT
Start: 2019-01-01 | End: 2019-01-01 | Stop reason: HOSPADM

## 2019-01-01 RX ORDER — L. ACIDOPHILUS/L.BULGARICUS 100MM CELL
1 GRANULES IN PACKET (EA) ORAL DAILY
Start: 2019-01-01

## 2019-01-01 RX ORDER — FAMOTIDINE 20 MG/1
20 TABLET, FILM COATED ORAL DAILY
Status: DISCONTINUED | OUTPATIENT
Start: 2019-01-01 | End: 2019-01-01 | Stop reason: HOSPADM

## 2019-01-01 RX ORDER — CELECOXIB 100 MG/1
200 CAPSULE ORAL DAILY
Status: DISCONTINUED | OUTPATIENT
Start: 2019-01-01 | End: 2019-01-01 | Stop reason: HOSPADM

## 2019-01-01 RX ORDER — AMOXICILLIN 250 MG
1 CAPSULE ORAL 2 TIMES DAILY
Status: DISCONTINUED | OUTPATIENT
Start: 2019-01-01 | End: 2019-01-01 | Stop reason: HOSPADM

## 2019-01-01 RX ORDER — ONDANSETRON 2 MG/ML
4 INJECTION INTRAMUSCULAR; INTRAVENOUS EVERY 8 HOURS PRN
Status: DISCONTINUED | OUTPATIENT
Start: 2019-01-01 | End: 2019-01-01 | Stop reason: HOSPADM

## 2019-01-01 RX ORDER — SEVELAMER CARBONATE FOR ORAL SUSPENSION 800 MG/1
2.4 POWDER, FOR SUSPENSION ORAL
Status: DISCONTINUED | OUTPATIENT
Start: 2019-01-01 | End: 2019-01-01 | Stop reason: HOSPADM

## 2019-01-01 RX ORDER — ACETAMINOPHEN 325 MG/1
650 TABLET ORAL EVERY 6 HOURS PRN
Status: DISCONTINUED | OUTPATIENT
Start: 2019-01-01 | End: 2019-01-01

## 2019-01-01 RX ORDER — ALLOPURINOL 100 MG/1
100 TABLET ORAL DAILY
Status: DISCONTINUED | OUTPATIENT
Start: 2019-01-01 | End: 2019-01-01 | Stop reason: HOSPADM

## 2019-01-01 RX ORDER — SODIUM CHLORIDE 0.9 % (FLUSH) 0.9 %
10 SYRINGE (ML) INJECTION
Status: DISCONTINUED | OUTPATIENT
Start: 2019-01-01 | End: 2019-01-01 | Stop reason: HOSPADM

## 2019-01-01 RX ORDER — LEVALBUTEROL INHALATION SOLUTION 0.63 MG/3ML
0.63 SOLUTION RESPIRATORY (INHALATION)
Status: COMPLETED | OUTPATIENT
Start: 2019-01-01 | End: 2019-01-01

## 2019-01-01 RX ORDER — ACETAMINOPHEN 325 MG/1
650 TABLET ORAL EVERY 4 HOURS PRN
Status: DISCONTINUED | OUTPATIENT
Start: 2019-01-01 | End: 2019-01-01 | Stop reason: HOSPADM

## 2019-01-01 RX ORDER — ZINC OXIDE 20 G/100G
OINTMENT TOPICAL 2 TIMES DAILY
Status: DISCONTINUED | OUTPATIENT
Start: 2019-01-01 | End: 2019-01-01 | Stop reason: HOSPADM

## 2019-01-01 RX ORDER — CELECOXIB 100 MG/1
400 CAPSULE ORAL ONCE
Status: COMPLETED | OUTPATIENT
Start: 2019-01-01 | End: 2019-01-01

## 2019-01-01 RX ORDER — INDOMETHACIN 25 MG/1
50 CAPSULE ORAL
Status: COMPLETED | OUTPATIENT
Start: 2019-01-01 | End: 2019-01-01

## 2019-01-01 RX ORDER — ONDANSETRON 4 MG/1
8 TABLET, ORALLY DISINTEGRATING ORAL EVERY 8 HOURS PRN
Status: DISCONTINUED | OUTPATIENT
Start: 2019-01-01 | End: 2019-01-01 | Stop reason: HOSPADM

## 2019-01-01 RX ORDER — MELOXICAM 15 MG/1
15 TABLET ORAL DAILY
Status: ON HOLD | COMMUNITY
End: 2019-01-01 | Stop reason: HOSPADM

## 2019-01-01 RX ORDER — OXYCODONE HYDROCHLORIDE 5 MG/1
5 TABLET ORAL EVERY 4 HOURS PRN
Status: DISCONTINUED | OUTPATIENT
Start: 2019-01-01 | End: 2019-01-01 | Stop reason: HOSPADM

## 2019-01-01 RX ADMIN — THERA TABS 1 TABLET: TAB at 08:11

## 2019-01-01 RX ADMIN — OXYCODONE HYDROCHLORIDE 5 MG: 5 TABLET ORAL at 11:11

## 2019-01-01 RX ADMIN — HEPARIN SODIUM 5000 UNITS: 5000 INJECTION INTRAVENOUS; SUBCUTANEOUS at 08:11

## 2019-01-01 RX ADMIN — ONDANSETRON 4 MG: 2 INJECTION INTRAMUSCULAR; INTRAVENOUS at 07:11

## 2019-01-01 RX ADMIN — CEPHALEXIN 500 MG: 250 CAPSULE ORAL at 08:11

## 2019-01-01 RX ADMIN — HEPARIN SODIUM 5000 UNITS: 5000 INJECTION INTRAVENOUS; SUBCUTANEOUS at 02:11

## 2019-01-01 RX ADMIN — ACETAMINOPHEN 650 MG: 325 TABLET ORAL at 05:11

## 2019-01-01 RX ADMIN — OXYCODONE HYDROCHLORIDE AND ACETAMINOPHEN 1 TABLET: 10; 325 TABLET ORAL at 08:11

## 2019-01-01 RX ADMIN — FAMOTIDINE 20 MG: 20 TABLET, FILM COATED ORAL at 08:11

## 2019-01-01 RX ADMIN — ONDANSETRON 4 MG: 2 INJECTION INTRAMUSCULAR; INTRAVENOUS at 08:11

## 2019-01-01 RX ADMIN — OXYCODONE HYDROCHLORIDE 5 MG: 5 TABLET ORAL at 01:11

## 2019-01-01 RX ADMIN — FAMOTIDINE 20 MG: 20 TABLET, FILM COATED ORAL at 09:11

## 2019-01-01 RX ADMIN — HUMAN INSULIN 10 UNITS: 100 INJECTION, SOLUTION SUBCUTANEOUS at 04:11

## 2019-01-01 RX ADMIN — CELECOXIB 200 MG: 100 CAPSULE ORAL at 10:11

## 2019-01-01 RX ADMIN — OXYCODONE HYDROCHLORIDE 5 MG: 5 TABLET ORAL at 12:11

## 2019-01-01 RX ADMIN — SEVELAMER CARBONATE 2.4 G: 800 POWDER, FOR SUSPENSION ORAL at 08:11

## 2019-01-01 RX ADMIN — KETOROLAC TROMETHAMINE 15 MG: 30 INJECTION, SOLUTION INTRAMUSCULAR; INTRAVENOUS at 06:11

## 2019-01-01 RX ADMIN — OXYCODONE HYDROCHLORIDE 5 MG: 5 TABLET ORAL at 09:11

## 2019-01-01 RX ADMIN — ALLOPURINOL 100 MG: 100 TABLET ORAL at 08:11

## 2019-01-01 RX ADMIN — ZINC OXIDE: 200 OINTMENT TOPICAL at 09:11

## 2019-01-01 RX ADMIN — LACTOBACILLUS ACIDOPHILUS / LACTOBACILLUS BULGARICUS 1 EACH: 100 MILLION CFU STRENGTH GRANULES at 09:11

## 2019-01-01 RX ADMIN — PATIROMER 8.4 G: 8.4 POWDER, FOR SUSPENSION ORAL at 05:11

## 2019-01-01 RX ADMIN — DEXTROSE 25 G: 50 INJECTION, SOLUTION INTRAVENOUS at 04:11

## 2019-01-01 RX ADMIN — ZINC OXIDE: 200 OINTMENT TOPICAL at 08:11

## 2019-01-01 RX ADMIN — OXYCODONE HYDROCHLORIDE 5 MG: 5 TABLET ORAL at 10:11

## 2019-01-01 RX ADMIN — EPOETIN ALFA-EPBX 8400 UNITS: 10000 INJECTION, SOLUTION INTRAVENOUS; SUBCUTANEOUS at 09:11

## 2019-01-01 RX ADMIN — HEPARIN SODIUM 5000 UNITS: 5000 INJECTION INTRAVENOUS; SUBCUTANEOUS at 10:11

## 2019-01-01 RX ADMIN — SEVELAMER CARBONATE 2.4 G: 800 POWDER, FOR SUSPENSION ORAL at 12:11

## 2019-01-01 RX ADMIN — SEVELAMER CARBONATE 2.4 G: 800 POWDER, FOR SUSPENSION ORAL at 06:11

## 2019-01-01 RX ADMIN — LACTOBACILLUS ACIDOPHILUS / LACTOBACILLUS BULGARICUS 1 EACH: 100 MILLION CFU STRENGTH GRANULES at 08:11

## 2019-01-01 RX ADMIN — HEPARIN SODIUM 5000 UNITS: 5000 INJECTION INTRAVENOUS; SUBCUTANEOUS at 01:11

## 2019-01-01 RX ADMIN — CELECOXIB 400 MG: 100 CAPSULE ORAL at 01:11

## 2019-01-01 RX ADMIN — ALLOPURINOL 100 MG: 100 TABLET ORAL at 09:11

## 2019-01-01 RX ADMIN — GUAIFENESIN AND DEXTROMETHORPHAN HYDROBROMIDE 1 TABLET: 600; 30 TABLET, EXTENDED RELEASE ORAL at 09:11

## 2019-01-01 RX ADMIN — HEPARIN SODIUM 5000 UNITS: 5000 INJECTION INTRAVENOUS; SUBCUTANEOUS at 05:11

## 2019-01-01 RX ADMIN — SEVELAMER CARBONATE 2.4 G: 800 POWDER, FOR SUSPENSION ORAL at 07:11

## 2019-01-01 RX ADMIN — CEPHALEXIN 500 MG: 250 CAPSULE ORAL at 09:11

## 2019-01-01 RX ADMIN — OXYCODONE HYDROCHLORIDE 5 MG: 5 TABLET ORAL at 02:11

## 2019-01-01 RX ADMIN — SEVELAMER CARBONATE 2.4 G: 800 POWDER, FOR SUSPENSION ORAL at 02:11

## 2019-01-01 RX ADMIN — SEVELAMER CARBONATE 2.4 G: 800 POWDER, FOR SUSPENSION ORAL at 05:11

## 2019-01-01 RX ADMIN — CEPHALEXIN 500 MG: 250 CAPSULE ORAL at 12:11

## 2019-01-01 RX ADMIN — SODIUM BICARBONATE 50 MEQ: 84 INJECTION, SOLUTION INTRAVENOUS at 04:11

## 2019-01-01 RX ADMIN — CALCIUM GLUCONATE 1 G: 98 INJECTION, SOLUTION INTRAVENOUS at 04:11

## 2019-01-01 RX ADMIN — SENNOSIDES AND DOCUSATE SODIUM 1 TABLET: 8.6; 5 TABLET ORAL at 10:11

## 2019-01-01 RX ADMIN — OXYCODONE HYDROCHLORIDE AND ACETAMINOPHEN 1 TABLET: 10; 325 TABLET ORAL at 06:11

## 2019-01-01 RX ADMIN — ZINC OXIDE 1 APPLICATION: 200 OINTMENT TOPICAL at 09:11

## 2019-01-01 RX ADMIN — HEPARIN SODIUM 5000 UNITS: 5000 INJECTION INTRAVENOUS; SUBCUTANEOUS at 09:11

## 2019-01-01 RX ADMIN — LEVALBUTEROL HYDROCHLORIDE 0.63 MG: 0.63 SOLUTION RESPIRATORY (INHALATION) at 04:11

## 2019-01-01 RX ADMIN — HEPARIN SODIUM 5000 UNITS: 5000 INJECTION INTRAVENOUS; SUBCUTANEOUS at 06:11

## 2019-10-16 PROBLEM — N18.9 ANEMIA DUE TO CHRONIC KIDNEY DISEASE: Status: ACTIVE | Noted: 2019-01-01

## 2019-10-16 PROBLEM — S72.002A CLOSED FRACTURE OF LEFT HIP: Status: ACTIVE | Noted: 2019-01-01

## 2019-10-16 PROBLEM — Z01.810 PREOP CARDIOVASCULAR EXAM: Status: ACTIVE | Noted: 2019-01-01

## 2019-10-16 PROBLEM — Z99.2 ESRD (END STAGE RENAL DISEASE) ON DIALYSIS: Status: ACTIVE | Noted: 2019-01-01

## 2019-10-16 PROBLEM — R54 AGE-RELATED PHYSICAL DEBILITY: Status: ACTIVE | Noted: 2019-01-01

## 2019-10-16 PROBLEM — N18.6 ESRD (END STAGE RENAL DISEASE) ON DIALYSIS: Status: ACTIVE | Noted: 2019-01-01

## 2019-10-16 PROBLEM — D63.1 ANEMIA DUE TO CHRONIC KIDNEY DISEASE: Status: ACTIVE | Noted: 2019-01-01

## 2019-10-18 PROBLEM — N40.0 BPH (BENIGN PROSTATIC HYPERPLASIA): Status: ACTIVE | Noted: 2019-01-01

## 2019-10-18 PROBLEM — E55.9 VITAMIN D DEFICIENCY: Status: ACTIVE | Noted: 2019-01-01

## 2019-10-19 PROBLEM — R41.0 ACUTE DELIRIUM: Status: ACTIVE | Noted: 2019-01-01

## 2019-10-19 PROBLEM — D62 ACUTE BLOOD LOSS ANEMIA: Status: ACTIVE | Noted: 2019-01-01

## 2019-10-19 PROBLEM — M10.9 GOUT: Status: ACTIVE | Noted: 2019-01-01

## 2019-10-21 PROBLEM — I95.9 HYPOTENSION: Status: ACTIVE | Noted: 2019-01-01

## 2019-10-21 PROBLEM — G89.18 POST-OP PAIN: Status: ACTIVE | Noted: 2019-01-01

## 2019-10-23 PROBLEM — R45.1 AGITATION: Status: ACTIVE | Noted: 2019-01-01

## 2019-10-28 PROBLEM — D72.825 BANDEMIA: Status: ACTIVE | Noted: 2019-01-01

## 2019-10-28 PROBLEM — D72.829 LEUKOCYTOSIS: Status: ACTIVE | Noted: 2019-01-01

## 2019-10-29 PROBLEM — N18.9 ANEMIA OF RENAL DISEASE: Status: ACTIVE | Noted: 2019-01-01

## 2019-10-29 PROBLEM — D63.1 ANEMIA OF RENAL DISEASE: Status: ACTIVE | Noted: 2019-01-01

## 2019-11-01 PROBLEM — E87.5 HYPERKALEMIA: Status: ACTIVE | Noted: 2019-01-01

## 2019-11-01 PROBLEM — D64.9 ANEMIA: Status: ACTIVE | Noted: 2019-01-01

## 2019-11-01 NOTE — CONSULTS
INPATIENT NEPHROLOGY CONSULT   [facility] NEPHROLOGY    Patient Name: Vinh Sanchez  Date: 11/01/2019    Reason for consultation: ESRD    Chief Complaint:   Chief Complaint   Patient presents with    Abnormal Lab     pt needs dialysis     Consulted by Dr. Garcia.    History of Present Illness:  89 y/o M with hx of dementia and ESRD on HD TTS who was brought in by daughter for dialysis placement. Patient was previously dialyzed at [facility] by Dr. Griffin. He was recently hospitalized at [facility] and seen by Dr. Simental. He was discharged to [facility] and accepted to [facility]. He had HD at the hospital on Tues and went for his first HD treatment at [facility] yest. I spoke to the patient's daughter and HD staff today. Daughter said that patient had a dirty diaper and she thinks that is why unit did not want to see him. She said she spoke to Dr. Simental and she doesn't want her father to go to this unit. She couldn't really elaborate further but she was very upset and angry. She called [facility] and was told to take patient to the ER in order to get him placed at [facility]. The [facility] HD staff said patient was not scheduled to start yest. They did not have all of his information for intake. He is a large man who requires complete assistance for transfers. The patient did have a dirty diaper. It would have taken 4-5 staff members to try to get him to the bathroom to change him. They were also told by [facility] that patient is combative and feels like dialysis is a senior living. They said they do not feel he is an appropriate candidate for their unit. My HD staff at the hospital contact HD staff at [facility] and was told patient will need restraints because he pulls out needles/lines.    Plan of Care:    Assessment:  Dialysis placement  ESRD  HTN  Hyperkalemia  SHPT  Anemia of CKD    Plan:    - This case will be a disposition challenge. From my standpoint, I am not sure why this 89 y/o man with dementia  remains on dialysis. This is quite unfortunate. He is a safety risk to himself and others and it seems cruel to restrain him in order to do a therapy that he likely doesn't want and which doesn't offer much in the way of meaningful quantity and quality of life.  - I am not sure where this patient should be placed. I have consulted PT, OT and SW/CM. If patient requires full assistance for mobility, we would need to make sure [address] has alexis lifts, etc. He would also need to be controlled from a psychiatric standpoint. I cannot risk staff safety with respect to these major issues. Moreover, [facility] cannot send patients to dialysis with dirty diapers- this needs to be evaluated.  - I have ordered HD today due to hyperkalemia. Will then resume HD TTS tomorrow.  - BP/VS appears stable. I have ordered 2-3L UF.  - I have ordered a 2K bath.  - Check phos.  - Hb 6.2- will need 2 units of blood with HD today. I have ordered DENISE with HD TTS as well.   - I have ordered restraints with HD.    Thank you for allowing us to participate in this patient's care. We will continue to follow.    Vital Signs:  Temp Readings from Last 3 Encounters:   11/01/19 98.1 °F (36.7 °C) (Oral)   10/30/19 98.1 °F (36.7 °C)       Pulse Readings from Last 3 Encounters:   11/01/19 72   10/30/19 66       BP Readings from Last 3 Encounters:   11/01/19 (!) 112/54   10/30/19 (!) 98/50       Weight:  Wt Readings from Last 3 Encounters:   11/01/19 83.9 kg (185 lb)   10/27/19 86.2 kg (190 lb 0.6 oz)       Past Medical & Surgical History:  Past Medical History:   Diagnosis Date    Dialysis patient     Hemodialysis access, AV graft     SYDNEY    Renal disorder     ESRD on hemodialysis Tues, Thurs, Sat       Past Surgical History:   Procedure Laterality Date    AV FISTULA PLACEMENT      INTRAMEDULLARY RODDING OF TROCHANTER OF FEMUR Left 10/17/2019    Procedure: INSERTION, INTRAMEDULLARY NOHEMI, FEMUR, TROCHANTER;  Surgeon: Geovanni Norton MD;   Location: [facility] OR;  Service: Orthopedics;  Laterality: Left;       Past Social History:  Social History     Socioeconomic History    Marital status:      Spouse name: Not on file    Number of children: Not on file    Years of education: Not on file    Highest education level: Not on file   Occupational History    Not on file   Social Needs    Financial resource strain: Not on file    Food insecurity:     Worry: Not on file     Inability: Not on file    Transportation needs:     Medical: Not on file     Non-medical: Not on file   Tobacco Use    Smoking status: Never Smoker    Smokeless tobacco: Never Used   Substance and Sexual Activity    Alcohol use: Never     Frequency: Never    Drug use: Never    Sexual activity: Not on file   Lifestyle    Physical activity:     Days per week: Not on file     Minutes per session: Not on file    Stress: Not on file   Relationships    Social connections:     Talks on phone: Not on file     Gets together: Not on file     Attends Jew service: Not on file     Active member of club or organization: Not on file     Attends meetings of clubs or organizations: Not on file     Relationship status: Not on file   Other Topics Concern    Not on file   Social History Narrative    Not on file       Medications:  No current facility-administered medications on file prior to encounter.      Current Outpatient Medications on File Prior to Encounter   Medication Sig Dispense Refill    allopurinol (ZYLOPRIM) 100 MG tablet Take 100 mg by mouth once daily.       cephALEXin (KEFLEX) 500 MG capsule Take 1 capsule (500 mg total) by mouth every 12 (twelve) hours. for 4 days 8 capsule 0    famotidine (PEPCID) 20 MG tablet Take 1 tablet (20 mg total) by mouth once daily. 30 tablet 1    heparin sodium,porcine (HEPARIN, PORCINE,) 5,000 unit/mL injection Inject 1 mL (5,000 Units total) into the skin every 8 (eight) hours. for 14 days      Lactobacillus rhamnosus GG  (CULTERELLE) 5 billion cell PwPk packet Take 1 packet (1 each total) by mouth once daily.      meloxicam (MOBIC) 15 MG tablet Take 15 mg by mouth once daily.      oxyCODONE-acetaminophen (PERCOCET)  mg per tablet Take 1 tablet by mouth every Tues, Thurs, Sat. For pain in route to dialysis      sevelamer carbonate (RENVELA) 2.4 gram PwPk Take 2.4 g by mouth 3 (three) times daily with meals. 216 g 11    vit b cmplx 3-fa-vit c-biotin 1- mg-mg-mcg (NEPHRO-FOUZIA RX OR EQUIV) 1- mg-mg-mcg Tab Take 1 tablet by mouth nightly.  0    acetaminophen (TYLENOL) 325 MG tablet Take 2 tablets (650 mg total) by mouth every 8 (eight) hours as needed.  0    menthol-zinc oxide 0.2-20% 0.2-20 % Pste paste Apply topically 2 (two) times daily.      ondansetron (ZOFRAN-ODT) 8 MG TbDL Take 1 tablet (8 mg total) by mouth every 8 (eight) hours as needed.      oxyCODONE (ROXICODONE) 5 MG immediate release tablet Take 1 tablet (5 mg total) by mouth every 4 (four) hours as needed for Pain. 20 tablet 0     Scheduled Meds:   [START ON 11/2/2019] epoetin fabi-ebpx (RETACRIT) injection  100 Units/kg Intravenous Every Tues, Thurs, Sat     Continuous Infusions:  PRN Meds:.    Allergies:  Dilaudid [hydromorphone] and Haldol [haloperidol lactate]    Past Family History:  Reviewed; refer to Hospitalist Admission Note    Review of Systems:  Review of Systems - All 14 systems reviewed and negative, except as noted in HPI    Physical Exam:  General Appearance:    NAD, AA, appears stated age   Head:    Normocephalic, atraumatic   Eyes:    PER, EOMI, and conjunctiva/sclera clear bilaterally       Throat:   Moist mucus membranes   Lungs:     Clear to auscultation bilaterally, no wheezes, crackles, rales    or rhonchi, symmetric air movement, respirations unlabored   Chest wall:    No tenderness or deformity   Heart:    Regular rate and rhythm, S1 and S2 normal, no murmur, rub   or gallop   Abdomen:     Soft, non-tender, non-distended,  bowel sounds active all four   quadrants, no RT or guarding, no masses, no organomegaly   Extremities:   Warm and well perfused, distal pulses are intact, no             cyanosis or peripheral edema   MSK:   No joint or muscle swelling, tenderness or deformity   Skin:   Skin color, texture, turgor normal, no rashes or lesions   Neurologic/Psychiatric:   Unable to assess, dementia, calm afect     Results:  Lab Results   Component Value Date     11/01/2019    K 5.8 (H) 11/01/2019     11/01/2019    CO2 25 11/01/2019    BUN 62 (H) 11/01/2019    CREATININE 9.0 (H) 11/01/2019    CALCIUM 10.1 11/01/2019    ANIONGAP 11 11/01/2019    ESTGFRAFRICA 5.3 (A) 11/01/2019    EGFRNONAA 4.6 (A) 11/01/2019       Lab Results   Component Value Date    CALCIUM 10.1 11/01/2019    PHOS 4.1 10/29/2019       No results for input(s): WBC, RBC, HGB, HCT, PLT, MCV, MCH, MCHC in the last 24 hours.    I have personally reviewed pertinent radiological imaging and reports.    I have spent > 70 minutes providing care for this patient for the above diagnoses. These services have included chart/data/imaging review, evaluation, exam, formulation of plan, , note preparation, and discussions with staff involved in this patient's care.    Conrad Deleon MD MPH  [facility] [address] [address]  [phone] (p)  [phone] (f)

## 2019-11-01 NOTE — ED PROVIDER NOTES
"Encounter Date: 11/1/2019       History     Chief Complaint   Patient presents with    Abnormal Lab     pt needs dialysis     This is a 89-year-old male who presents with family with a report that he needs dialysis.  Patient was last dialyzed on Tuesday of this week.  He did not receive dialysis yesterday when he went to a new dialysis center and there was confusion over receiving dialysis and apparently some argument with staff there and the patient did not receive dialysis.  He is normally dialyzed with [facility] in [place] however is currently in [place] and residing at the guest house secondary to a recent hip fracture.  The patient's daughter lives here.  The daughter denies any problems or complaints except for the fact that he came here so that he could receive dialysis.  The patient has dementia and is not able to provide further history.  The patient's daughter denies any other problems or complaints.  He has not been noted to be short of breath edematous or having any sort of discomfort, or respiratory problems.        Review of patient's allergies indicates:   Allergen Reactions    Dilaudid [hydromorphone] Other (See Comments)     Family states "Delirium    Haldol [haloperidol lactate]      Past Medical History:   Diagnosis Date    Dialysis patient     Hemodialysis access, AV graft     SYDNEY    Renal disorder     ESRD on hemodialysis Tues, Thurs, Sat     Past Surgical History:   Procedure Laterality Date    AV FISTULA PLACEMENT      INTRAMEDULLARY RODDING OF TROCHANTER OF FEMUR Left 10/17/2019    Procedure: INSERTION, INTRAMEDULLARY NOHEMI, FEMUR, TROCHANTER;  Surgeon: Geovanni Norton MD;  Location: [facility];  Service: Orthopedics;  Laterality: Left;     No family history on file.  Social History     Tobacco Use    Smoking status: Never Smoker    Smokeless tobacco: Never Used   Substance Use Topics    Alcohol use: Never     Frequency: Never    Drug use: Never     Review of Systems   Unable " to perform ROS: Dementia       Physical Exam     Initial Vitals   BP Pulse Resp Temp SpO2   11/01/19 1402 11/01/19 1400 11/01/19 1400 11/01/19 1400 11/01/19 1402   (!) 107/58 (P) 69 (P) 17 (P) 98.1 °F (36.7 °C) 98 %      MAP       --                Physical Exam    Nursing note and vitals reviewed.  Constitutional: He appears well-developed and well-nourished. He is not diaphoretic. No distress.   HENT:   Head: Normocephalic and atraumatic.   Nose: Nose normal.   Mouth/Throat: Oropharynx is clear and moist and mucous membranes are normal. No oropharyngeal exudate.   Eyes: Conjunctivae, EOM and lids are normal. Pupils are equal, round, and reactive to light.   Neck: Normal range of motion. Neck supple. No thyromegaly present. No tracheal deviation present. No JVD present.   Cardiovascular: Normal rate, regular rhythm, normal heart sounds and intact distal pulses. Exam reveals no gallop and no friction rub.    No murmur heard.  Pulmonary/Chest: Breath sounds normal. No stridor. No respiratory distress. He has no wheezes. He has no rhonchi. He has no rales.   Abdominal: Soft. Bowel sounds are normal. He exhibits no distension and no mass. There is no tenderness. There is no rebound and no guarding.   Musculoskeletal: Normal range of motion. He exhibits no tenderness.   Lymphadenopathy:     He has no cervical adenopathy.   Neurological: He is alert. He has normal strength. He is disoriented. No cranial nerve deficit or sensory deficit.   Skin: Skin is warm and dry. Capillary refill takes less than 2 seconds. No rash noted. No erythema. No pallor.   Psychiatric: He has a normal mood and affect. His speech is delayed. He is slowed and withdrawn. Cognition and memory are impaired. He expresses inappropriate judgment.         ED Course   Procedures  Labs Reviewed   TROPONIN I   AMYLASE   B-TYPE NATRIURETIC PEPTIDE   CBC W/ AUTO DIFFERENTIAL   COMPREHENSIVE METABOLIC PANEL   LIPASE   MAGNESIUM   TSH   URINALYSIS   DRUG  SCREEN PANEL, URINE EMERGENCY   CK   CK-MB        ECG Results          EKG 12-lead (In process)  Result time 11/01/19 15:00:52    In process by Interface, Lab In [facility] (11/01/19 15:00:52)                 Narrative:    Test Reason : R53.81,    Vent. Rate : 073 BPM     Atrial Rate : 073 BPM     P-R Int : 224 ms          QRS Dur : 098 ms      QT Int : 370 ms       P-R-T Axes : 000 -24 002 degrees     QTc Int : 407 ms    Sinus rhythm with 1st degree A-V block  Septal infarct ,age undetermined  Abnormal ECG  No previous ECGs available    Referred By: [email or web address]   SELF           Confirmed By:                             Imaging Results    None          Medical Decision Making:   Independently Interpreted Test(s):   I have ordered and independently interpreted EKG Reading(s) - see summary below       <> Summary of EKG Reading(s): Sinus rhythm, no evidence of acute ischemia.  Clinical Tests:   Lab Tests: Reviewed  Radiological Study: Reviewed  Medical Tests: Reviewed  ED Management:  The patient is currently hemodynamically stable.  He has not ill appearing.  He is in no distress. He is not exhibiting evidence of acute volume overload.  I have discussed the case with  on-call for Nephrology who evaluated the patient personally in the emergency room.  Labs were pending at that time.  Dialysis will be arranged.  Patient has evidence of anemia.  There has been no visualized gastrointestinal or other bleeding.  He did have surgery this month.  Type and screen has been obtained.  I have discussed the case with the hospitalist provider and the patient will be admitted, dialyzed with nephrology consultation.  The hospitalist provider has requested that transfusion be prepped but held until dialysis time and this will be discussed by the hospitalist provider with Dr Ureña.  The patient is currently hemodynamically stable, non ill-appearing, afebrile and in no acute distress.                      Clinical Impression:        ICD-10-CM ICD-9-CM   1. Hyperkalemia E87.5 276.7   2. Malaise R53.81 780.79   3. Admission for dialysis Z99.2 V56.0   4. Symptomatic anemia D64.9 285.9                                Sara Garcia MD  11/01/19 0181

## 2019-11-01 NOTE — ED NOTES
Daughter  reports that yesterday her father did not get dialysis-today his labs were abnormal so we brought him in so we could get him dialysis with kisha-family wanting pt to stay in [facility].

## 2019-11-02 NOTE — HPI
Mr. Sanchez is a  89 year old male who is brought to the ED by his family and reports that the patient needs dialysis. The patient has been a resident at the [facility] s/p surgery to Left hip after sustaining a fracture. He has ESRD on chronic HD. He is normally [facility] in [place] by Dr. Griffin, but since he is currently residing in [place], he has been seen by Dr. Simental and was to begin HD with [facility]. His last HD session was on this past Tuesday. When he went for his session at [facility] on yesterday, the family and the staff reportedly had a disagreement concerning the patient's dirty diaper, and the patient did not have the dialysis. The daughter reports she does not want her father to have dialysis at that facility. She called [facility] in [place] and was told to take the patient to the ED so he can be placed at [facility] in [place].  Lab work shows the patient is found to have Hyperkalemia at 5.8. H/H are found to be critically low at 6.2/20.  The patient has dementia and is unable to provide history. Family denies any other complaints. VSS. The patient does not appear to be in any distress. CXR with pulmonary vascular congetion. Dr. Motley is consulted and sees the patient in the ED. The patient will be admitted to have HD and 2 units of PRBC.

## 2019-11-02 NOTE — ASSESSMENT & PLAN NOTE
2Units PRBC during HD  Follow H/H  No signs of obvious bleeding; likely secondary to ESRD  Mechanical VTE prophylaxis at this time as patient is high fall risk. Consider restarting anticoagulation in AM

## 2019-11-02 NOTE — SUBJECTIVE & OBJECTIVE
Interval History:   This is a 89-year-old male admitted to the hospital for missing dialysis.    Patient is severely confused.  As per the daughter present at the bedside his baseline status is that he can walk , talk, bath without assistance and Drive.  He lives with his wife.  He had a fall week ago in which she had a fracture which was repaired in an outlying facility after which he never came back to his baseline status.  Patient denied to be in pain.      Review of Systems not obtained from patient as patient confused. ROS obtained from the daughter present at bedside.   Objective:     Vital Signs (Most Recent):  Temp: 97.5 °F (36.4 °C) (11/02/19 1638)  Pulse: 89 (11/02/19 1638)  Resp: 17 (11/02/19 1638)  BP: 135/69 (11/02/19 1638)  SpO2: (!) 94 % (11/02/19 1638) Vital Signs (24h Range):  Temp:  [97.5 °F (36.4 °C)-98 °F (36.7 °C)] 97.5 °F (36.4 °C)  Pulse:  [66-91] 89  Resp:  [16-18] 17  SpO2:  [94 %] 94 %  BP: ()/(38-79) 135/69     Weight: 83.9 kg (185 lb)  Body mass index is 25.09 kg/m².    Intake/Output Summary (Last 24 hours) at 11/2/2019 1811  Last data filed at 11/2/2019 1250  Gross per 24 hour   Intake 1600 ml   Output 4287 ml   Net -2687 ml      Physical Exam   Constitutional: No distress.   HENT:   Head: Atraumatic.   Cardiovascular: Normal rate, regular rhythm and normal heart sounds.   Pulmonary/Chest: Effort normal. He has no wheezes.   Abdominal: Soft. Bowel sounds are normal. He exhibits no distension and no mass. There is no tenderness. There is no guarding.   Neurological: He is alert.   Skin: Skin is warm and dry.   Vitals reviewed.      Significant Labs: All pertinent labs within the past 24 hours have been reviewed.    Significant Imaging: I have reviewed all pertinent imaging results/findings within the past 24 hours.

## 2019-11-02 NOTE — CONSULTS
[facility]  Adult Nutrition  Consult Note    SUMMARY     Recommendations/Interventions:    Recommendation/Intervention: 1.) Add mechanical soft diet texture to aid PO intake 2.) Nepro BID (850 kcal, 38 g pro) to aid PO intake 3.) Provide meal assist and encouragement all meals  Goals: 1.) Pt to meet >75% estimated needs via PO diet/supplements   Nutrition Goal Status: progressing towards goal  Communication of RD Recs: reviewed with RN    Reason for Assessment    Reason For Assessment: identified at risk by screening criteria, consult(MST score)  Diagnosis: other (see comments)(anemia)  Relevant Medical History: ESRD on HD, NH resident, dementia     Nutrition Risk Screen    Nutrition Risk Screen: dysphagia or difficulty swallowing      MST score: 2     Have you recently lost weight without trying?: 2 (unsure)  Weight loss score: 2  Have you been eating poorly because of a decreased appetite? 0  Appetite score: 0      Nutrition/Diet History    Patient Reported Diet/Restrictions/Preferences: renal  Food Allergies: NKFA  Factors Affecting Nutritional Intake: inability to feed self, chewing difficulties/inability to chew food    Anthropometrics    Temp: 97.7 °F (36.5 °C)  Height Method: Stated  Height: 6' (182.9 cm)  Height (inches): 72 in  Weight Method: Bed Scale  Weight: 83.9 kg (185 lb)  Weight (lb): 185 lb  Ideal Body Weight (IBW), Male: 178 lb  % Ideal Body Weight, Male (lb): 103.93 lb  BMI (Calculated): 25.1  BMI Grade: 25 - 29.9 - overweight  Weight Loss: other (see comments)(family denies)       Weight History:  Wt Readings from Last 10 Encounters:   11/02/19 83.9 kg (185 lb)   10/27/19 86.2 kg (190 lb 0.6 oz)     RD interpretation of weight hx: Noted 5# wt loss over past 6 days--? Fluid changes 2' ESRD/HD  Lab/Procedures/Meds: Pertinent Labs Reviewed  Clinical Chemistry:  Recent Labs   Lab 10/28/19  0416 10/29/19  0825  11/01/19  1345 11/02/19  0558    135*  --  137 139   K 5.1 5.4*  --   5.8* 5.1    98  --  101 99   CO2 30 29  --  25 27   * 107  --  92 102   BUN 42* 57*  --  62* 50*   CREATININE 7.02* 8.67*  --  9.0* 7.3*   CALCIUM 10.4* 10.2  --  10.1 10.2   PROT  --   --    < > 6.6 7.5   ALBUMIN 3.3* 3.0*  --  2.6* 2.9*   BILITOT  --   --    < > 0.9 1.2*   ALKPHOS  --   --    < > 75 86   AST  --   --    < > 22 18   ALT  --   --    < > 10 9*   ANIONGAP 7* 8  --  11 13   ESTGFRAFRICA 7* 6*  --  5.3* 6.8*   EGFRNONAA 6* 5*  --  4.6* 5.9*   MG  --   --    < > 2.6 2.4   PHOS 3.8 4.1  --   --  4.5   AMYLASE  --   --   --  98  --    LIPASE  --   --   --  27  --     < > = values in this interval not displayed.     CBC:   Recent Labs   Lab 11/02/19  0558   WBC 12.02   RBC 3.23*   HGB 10.4*   HCT 33.5*   *   *   MCH 32.2*   MCHC 31.0*     Lipid Panel:  Recent Labs   Lab 11/02/19  0558   CHOL 169   HDL 35*   LDLCALC 100.8   TRIG 166*   CHOLHDL 20.7     Cardiac Profile:  Recent Labs   Lab 11/01/19  1345 11/02/19  0558   *  --    CPK 83  --    CPKMB 3.1  --    TROPONINI 0.088* 0.078*     Thyroid & Parathyroid:  Recent Labs   Lab 11/01/19  1345   TSH 2.210       Medications: Pertinent Medications reviewed  Scheduled Meds:   allopurinol  100 mg Oral Daily    cephALEXin  500 mg Oral Q12H    epoetin fabi-ebpx (RETACRIT) injection  100 Units/kg Intravenous Every Tues, Thurs, Sat    famotidine  20 mg Oral Daily    lactobacillus acidophilus & bulgar  1 packet Oral Daily    multivitamin  1 tablet Oral QHS    oxyCODONE-acetaminophen  1 tablet Oral Every Tues, Thurs, Sat    senna-docusate 8.6-50 mg  1 tablet Oral BID    sevelamer carbonate  2.4 g Oral TID WM    zinc oxide   Topical (Top) BID     Continuous Infusions:  PRN Meds:.sodium chloride, acetaminophen, ondansetron, ondansetron, oxyCODONE, sodium chloride 0.9%    Estimated/Assessed Needs    Weight Used For Calorie Calculations: 83.9 kg (184 lb 15.5 oz)  Energy Calorie Requirements (kcal): 1212-9475 (25-30  kcal/kg)  Energy Need Method: Kcal/kg  Protein Requirements: 100-126 g (1.2-1.5 g/kg)  Weight Used For Protein Calculations: 83.9 kg (184 lb 15.5 oz)     Estimated Fluid Requirement Method: RDA Method    Nutrition Prescription Ordered    Current Diet Order: renal     Evaluation of Received Nutrient/Fluid Intake    Energy Calories Required: not meeting needs  Protein Required: not meeting needs  Fluid Required: meeting needs  Tolerance: tolerating    % Meal Intake: 50 - 75 %    Intake/Output Summary (Last 24 hours) at 11/2/2019 0940  Last data filed at 11/1/2019 2201  Gross per 24 hour   Intake 1200 ml   Output 4130 ml   Net -2930 ml      Nutrition Risk    Level of Risk/Frequency of Follow-up: moderate - high     Dietitian Rounds Brief:    Pt assessed 2' consult 2' dysphagia. Nutritional care discussed with daughter @ bedside and RN. Noted hx dysphagia. Tolerated breakfast without difficulties. Daughter & RN deny any s/s dysphagia. Pt missing dentures with causes him to require soft diet. Daughter reports ST eval completed recently and patient passed successfully. Agreeable to mechanical soft texture to aid PO. Requires total assist with meals. No N/V. LBM 10/30. Pt to receive PRBC x 2. Will add oral supplement to aid PO intake--daughter made aware.     Monitor and Evaluation    Food and Nutrient Intake: food and beverage intake, energy intake  Food and Nutrient Adminstration: diet order  Physical Activity and Function: nutrition-related ADLs and IADLs  Anthropometric Measurements: weight change, weight  Biochemical Data, Medical Tests and Procedures: gastrointestinal profile, electrolyte and renal panel, glucose/endocrine profile  Nutrition-Focused Physical Findings: overall appearance     Malnutrition Assessment  [place] (Muscle Loss): mild depletion   Edema (Fluid Accumulation): 0-->no edema present     Nutrition Follow-Up    RD Follow-up?: Yes      Sabiha Nolan RD 11/02/2019 9:40 AM

## 2019-11-02 NOTE — PROGRESS NOTES
INPATIENT NEPHROLOGY PROGRESS  [facility] NEPHROLOGY    Patient Name: Vinh Sanchez  Date: 11/02/2019    Reason for consultation: ESRD    Chief Complaint:   Chief Complaint   Patient presents with    Abnormal Lab     pt needs dialysis     Consulted by Dr. Garcia.    History of Present Illness:  89 y/o M with hx of dementia and ESRD on HD TTS who was brought in by daughter for dialysis placement. Patient was previously dialyzed at [facility] by Dr. Griffin. He was recently hospitalized at [facility] and seen by Dr. Simental. He was discharged to [facility] and accepted to [facility]. He had HD at the hospital on Tues and went for his first HD treatment at [facility] yest. I spoke to the patient's daughter and HD staff today. Daughter said that patient had a dirty diaper and she thinks that is why unit did not want to see him. She said she spoke to Dr. Simental and she doesn't want her father to go to this unit. She couldn't really elaborate further but she was very upset and angry. She called [facility] and was told to take patient to the ER in order to get him placed at [facility]. The [facility] HD staff said patient was not scheduled to start yest. They did not have all of his information for intake. He is a large man who requires complete assistance for transfers. The patient did have a dirty diaper. It would have taken 4-5 staff members to try to get him to the bathroom to change him. They were also told by [facility] that patient is combative and feels like dialysis is a penitentiary. They said they do not feel he is an appropriate candidate for their unit. My HD staff at the hospital contact HD staff at [facility] and was told patient will need restraints because he pulls out needles/lines.    11/02  Hgb improved post transfusion.  Pt seen today on dialysis.  BP dropping during treatment, RN having to turn off pull.  Pt alert, not talking.    Plan of Care:    Assessment:  Dialysis  placement  ESRD  HTN  Hyperkalemia  SHPT  Anemia of CKD    Plan:    - This case will be a disposition challenge. From my standpoint, I am not sure why this 89 y/o man with dementia remains on dialysis. This is quite unfortunate. He is a safety risk to himself and others and it seems cruel to restrain him in order to do a therapy that he likely doesn't want and which doesn't offer much in the way of meaningful quantity and quality of life.  - I am not sure where this patient should be placed. I have consulted PT, OT and SW/CM. If patient requires full assistance for mobility, we would need to make sure [address] has alexis lifts, etc. He would also need to be controlled from a psychiatric standpoint. I cannot risk staff safety with respect to these major issues. Moreover, [facility] cannot send patients to dialysis with dirty diapers- this needs to be evaluated.  - I have ordered HD today due to hyperkalemia. Will then resume HD TTS tomorrow.  - BP/VS appears stable. I have ordered 2-3L UF.  - I have ordered a 2K bath.  - Check phos.  - Hb 6.2- will need 2 units of blood with HD today. I have ordered DENISE with HD TTS as well.   - I have ordered restraints with HD.    Thank you for allowing us to participate in this patient's care. We will continue to follow.    Vital Signs:  Temp Readings from Last 3 Encounters:   11/02/19 97.7 °F (36.5 °C) (Axillary)   10/30/19 98.1 °F (36.7 °C)       Pulse Readings from Last 3 Encounters:   11/02/19 70   10/30/19 66       BP Readings from Last 3 Encounters:   11/02/19 102/60   10/30/19 (!) 98/50       Weight:  Wt Readings from Last 3 Encounters:   11/01/19 83.9 kg (185 lb)   10/27/19 86.2 kg (190 lb 0.6 oz)       Past Medical & Surgical History:  Past Medical History:   Diagnosis Date    Dialysis patient     Hemodialysis access, AV graft     SYDNEY    Renal disorder     ESRD on hemodialysis Tues, Thurs, Sat       Past Surgical History:   Procedure Laterality Date    AV  FISTULA PLACEMENT      INTRAMEDULLARY RODDING OF TROCHANTER OF FEMUR Left 10/17/2019    Procedure: INSERTION, INTRAMEDULLARY NOHEMI, FEMUR, TROCHANTER;  Surgeon: Geovanni Norton MD;  Location: [facility] OR;  Service: Orthopedics;  Laterality: Left;       Past Social History:  Social History     Socioeconomic History    Marital status:      Spouse name: Not on file    Number of children: Not on file    Years of education: Not on file    Highest education level: Not on file   Occupational History    Not on file   Social Needs    Financial resource strain: Not on file    Food insecurity:     Worry: Not on file     Inability: Not on file    Transportation needs:     Medical: Not on file     Non-medical: Not on file   Tobacco Use    Smoking status: Never Smoker    Smokeless tobacco: Never Used   Substance and Sexual Activity    Alcohol use: Never     Frequency: Never    Drug use: Never    Sexual activity: Not on file   Lifestyle    Physical activity:     Days per week: Not on file     Minutes per session: Not on file    Stress: Not on file   Relationships    Social connections:     Talks on phone: Not on file     Gets together: Not on file     Attends Mosque service: Not on file     Active member of club or organization: Not on file     Attends meetings of clubs or organizations: Not on file     Relationship status: Not on file   Other Topics Concern    Not on file   Social History Narrative    Not on file       Medications:  No current facility-administered medications on file prior to encounter.      Current Outpatient Medications on File Prior to Encounter   Medication Sig Dispense Refill    allopurinol (ZYLOPRIM) 100 MG tablet Take 100 mg by mouth once daily.       cephALEXin (KEFLEX) 500 MG capsule Take 1 capsule (500 mg total) by mouth every 12 (twelve) hours. for 4 days 8 capsule 0    famotidine (PEPCID) 20 MG tablet Take 1 tablet (20 mg total) by mouth once daily. 30 tablet 1    heparin  sodium,porcine (HEPARIN, PORCINE,) 5,000 unit/mL injection Inject 1 mL (5,000 Units total) into the skin every 8 (eight) hours. for 14 days      Lactobacillus rhamnosus GG (CULTERELLE) 5 billion cell PwPk packet Take 1 packet (1 each total) by mouth once daily.      meloxicam (MOBIC) 15 MG tablet Take 15 mg by mouth once daily.      oxyCODONE-acetaminophen (PERCOCET)  mg per tablet Take 1 tablet by mouth every Tues, Thurs, Sat. For pain in route to dialysis      sevelamer carbonate (RENVELA) 2.4 gram PwPk Take 2.4 g by mouth 3 (three) times daily with meals. 216 g 11    vit b cmplx 3-fa-vit c-biotin 1- mg-mg-mcg (NEPHRO-FOUZIA RX OR EQUIV) 1- mg-mg-mcg Tab Take 1 tablet by mouth nightly.  0    acetaminophen (TYLENOL) 325 MG tablet Take 2 tablets (650 mg total) by mouth every 8 (eight) hours as needed.  0    menthol-zinc oxide 0.2-20% 0.2-20 % Pste paste Apply topically 2 (two) times daily.      ondansetron (ZOFRAN-ODT) 8 MG TbDL Take 1 tablet (8 mg total) by mouth every 8 (eight) hours as needed.      oxyCODONE (ROXICODONE) 5 MG immediate release tablet Take 1 tablet (5 mg total) by mouth every 4 (four) hours as needed for Pain. 20 tablet 0     Scheduled Meds:   allopurinol  100 mg Oral Daily    cephALEXin  500 mg Oral Q12H    epoetin fabi-ebpx (RETACRIT) injection  100 Units/kg Intravenous Every Tues, Thurs, Sat    famotidine  20 mg Oral Daily    lactobacillus acidophilus & bulgar  1 packet Oral Daily    oxyCODONE-acetaminophen  1 tablet Oral Every Tues, Thurs, Sat    senna-docusate 8.6-50 mg  1 tablet Oral BID    sevelamer carbonate  2.4 g Oral TID WM    vit b cmplx 3-fa-vit c-biotin 1- mg-mg-mcg  1 tablet Oral Nightly    zinc oxide   Topical (Top) BID     Continuous Infusions:  PRN Meds:.    Allergies:  Dilaudid [hydromorphone] and Haldol [haloperidol lactate]    Past Family History:  Reviewed; refer to Hospitalist Admission Note    Review of Systems:  Review of Systems -  All 14 systems reviewed and negative, except as noted in HPI    Physical Exam:  General Appearance:    NAD, AA, appears stated age   Head:    Normocephalic, atraumatic   Eyes:    PER, EOMI, and conjunctiva/sclera clear bilaterally       Throat:   Moist mucus membranes   Lungs:     Clear to auscultation bilaterally, no wheezes, crackles, rales    or rhonchi, symmetric air movement, respirations unlabored   Chest wall:    No tenderness or deformity   Heart:    Regular rate and rhythm, S1 and S2 normal, no murmur, rub   or gallop   Abdomen:     Soft, non-tender, non-distended, bowel sounds active all four   quadrants, no RT or guarding, no masses, no organomegaly   Extremities:   Warm and well perfused, distal pulses are intact, no             cyanosis or peripheral edema   MSK:   No joint or muscle swelling, tenderness or deformity   Skin:   Skin color, texture, turgor normal, no rashes or lesions   Neurologic/Psychiatric:   Unable to assess, dementia, calm afect     Results:  Lab Results   Component Value Date     11/02/2019    K 5.1 11/02/2019    CL 99 11/02/2019    CO2 27 11/02/2019    BUN 50 (H) 11/02/2019    CREATININE 7.3 (H) 11/02/2019    CALCIUM 10.2 11/02/2019    ANIONGAP 13 11/02/2019    ESTGFRAFRICA 6.8 (A) 11/02/2019    EGFRNONAA 5.9 (A) 11/02/2019       Lab Results   Component Value Date    CALCIUM 10.2 11/02/2019    PHOS 4.5 11/02/2019       Recent Labs   Lab 11/02/19  0558   WBC 12.02   RBC 3.23*   HGB 10.4*   HCT 33.5*   *   *   MCH 32.2*   MCHC 31.0*       I have personally reviewed pertinent radiological imaging and reports.    I have spent > 70 minutes providing care for this patient for the above diagnoses. These services have included chart/data/imaging review, evaluation, exam, formulation of plan, , note preparation, and discussions with staff involved in this patient's care.    Conrad Deleon MD MPH  [facility] [place]  [address] [place] [address]  [address]  [phone] (p)  [phone] (f)

## 2019-11-02 NOTE — PROGRESS NOTES
[facility] Medicine  Progress Note    Patient Name: Vinh Sanchez  MRN: [id number]  Patient Class: IP- Inpatient   Admission Date: 11/1/2019  Length of Stay: 1 days  Attending Physician: Cedric Caballero MD  Primary Care Provider: Alysha Elena MD        Subjective:     Principal Problem:Anemia        HPI:   Mr. Sanchez is a  89 year old male who is brought to the ED by his family and reports that the patient needs dialysis. The patient has been a resident at the [facility] s/p surgery to Left hip after sustaining a fracture. He has ESRD on chronic HD. He is normally dialyzed by [facility] in [place] by Dr. Griffin, but since he is currently residing in [place], he has been seen by Dr. Simental and was to begin HD with [facility]. His last HD session was on this past Tuesday. When he went for his session at [facility] on yesterday, the family and the staff reportedly had a disagreement concerning the patient's dirty diaper, and the patient did not have the dialysis. The daughter reports she does not want her father to have dialysis at that facility. She called [facility] in [place] and was told to take the patient to the ED so he can be placed at [facility] in [place].  Lab work shows the patient is found to have Hyperkalemia at 5.8. H/H are found to be critically low at 6.2/20.  The patient has dementia and is unable to provide history. Family denies any other complaints. VSS. The patient does not appear to be in any distress. CXR with pulmonary vascular congetion. Dr. Motley is consulted and sees the patient in the ED. The patient will be admitted to have HD and 2 units of PRBC.     Overview/Hospital Course:  11/1: Admitted, Dialysis done x 1    Interval History:   This is a 89-year-old male admitted to the hospital for missing dialysis.    Patient is severely confused.  As per the daughter present at the bedside his baseline status is that he can walk , talk, bath without assistance  and Drive.  He lives with his wife.  He had a fall week ago in which she had a fracture which was repaired in an outlying facility after which he never came back to his baseline status.  Patient denied to be in pain.      Review of Systems not obtained from patient as patient confused. ROS obtained from the daughter present at bedside.   Objective:     Vital Signs (Most Recent):  Temp: 97.5 °F (36.4 °C) (11/02/19 1638)  Pulse: 89 (11/02/19 1638)  Resp: 17 (11/02/19 1638)  BP: 135/69 (11/02/19 1638)  SpO2: (!) 94 % (11/02/19 1638) Vital Signs (24h Range):  Temp:  [97.5 °F (36.4 °C)-98 °F (36.7 °C)] 97.5 °F (36.4 °C)  Pulse:  [66-91] 89  Resp:  [16-18] 17  SpO2:  [94 %] 94 %  BP: ()/(38-79) 135/69     Weight: 83.9 kg (185 lb)  Body mass index is 25.09 kg/m².    Intake/Output Summary (Last 24 hours) at 11/2/2019 1811  Last data filed at 11/2/2019 1250  Gross per 24 hour   Intake 1600 ml   Output 4287 ml   Net -2687 ml      Physical Exam   Constitutional: No distress.   HENT:   Head: Atraumatic.   Cardiovascular: Normal rate, regular rhythm and normal heart sounds.   Pulmonary/Chest: Effort normal. He has no wheezes.   Abdominal: Soft. Bowel sounds are normal. He exhibits no distension and no mass. There is no tenderness. There is no guarding.   Neurological: He is alert.   Skin: Skin is warm and dry.   Vitals reviewed.      Significant Labs: All pertinent labs within the past 24 hours have been reviewed.    Significant Imaging: I have reviewed all pertinent imaging results/findings within the past 24 hours.      Assessment/Plan:      * Anemia  Status post 2 units transfusion of packed red blood cells.  Monitor hemoglobin.  Hyperkalemia  Resolved after dialysis.    ESRD (end stage renal disease) on dialysis:  Nephrology on board.  Plan is to start the dialysis back on Tuesday Thursday Saturday.      Discharge Planning:  Currently patient has altered mental status.  This could be just or delirium.  In terms of  discharge planning,  patient came from [facility].  Patient family does not want to go back to the same dialysis center.  They want to change his  dialysis center.  [facility] is not going to accept the patient without established dialysis center.  Discussed with on-call .    Face-to-Face encounter date: 11/02/2019    Encounter included review of the medical records, interviewing and examining the patient face-to-face, discussion with family and other health care providers, ordering and interpreting lab/test results and formulating a plan of care.     Medical Decision Making during this encounter was  [_] Low Complexity  [_] Moderate Complexity  [x] High Complexity          VTE Risk Mitigation (From admission, onward)         Ordered     Place sequential compression device  Until discontinued      11/01/19 2102     Place GRACIA hose  Until discontinued      11/01/19 2101     Reason for No Pharmacological VTE Prophylaxis  Once      11/01/19 1753     IP VTE HIGH RISK PATIENT  Once      11/01/19 1753                      Cedric Caballero MD  Department of Hospital Medicine   [facility]

## 2019-11-02 NOTE — PT/OT/SLP PROGRESS
Physical Therapy      Patient Name:  Vinh Sanchez   MRN:  [id number]    Patient not seen today secondary to dialysis  . Will follow-up 11-.    Raquel Burgos, PT

## 2019-11-02 NOTE — H&P
[facility] Medicine  History & Physical    Patient Name: Vinh Sanchez  MRN: [id number]  Admission Date: 11/1/2019  Attending Physician: Jessica Varma MD   Primary Care Provider: Alysha Elena MD         Patient information was obtained from relative(s) and ER records.     Subjective:     Principal Problem:Anemia    Chief Complaint:   Chief Complaint   Patient presents with    Abnormal Lab     pt needs dialysis        HPI:  Mr. Sanchez is a  89 year old male who is brought to the ED by his family and reports that the patient needs dialysis. The patient has been a resident at the [facility] s/p surgery to Left hip after sustaining a fracture. He has ESRD on chronic HD. He is normally dialyzed by [facility] in [place] by Dr. Griffin, but since he is currently residing in [place], he has been seen by Dr. Simental and was to begin HD with [facility]. His last HD session was on this past Tuesday. When he went for his session at [facility] on yesterday, the family and the staff reportedly had a disagreement concerning the patient's dirty diaper, and the patient did not have the dialysis. The daughter reports she does not want her father to have dialysis at that facility. She called [facility] in [place] and was told to take the patient to the ED so he can be placed at [facility] in [place].  Lab work shows the patient is found to have Hyperkalemia at 5.8. H/H are found to be critically low at 6.2/20.  The patient has dementia and is unable to provide history. Family denies any other complaints. VSS. The patient does not appear to be in any distress. CXR with pulmonary vascular congetion. Dr. Motley is consulted and sees the patient in the ED. The patient will be admitted to have HD and 2 units of PRBC.     Past Medical History:   Diagnosis Date    Dialysis patient     Hemodialysis access, AV graft     SYDNEY    Renal disorder     ESRD on hemodialysis Tues, Thurs, Sat       Past Surgical  "History:   Procedure Laterality Date    AV FISTULA PLACEMENT      INTRAMEDULLARY RODDING OF TROCHANTER OF FEMUR Left 10/17/2019    Procedure: INSERTION, INTRAMEDULLARY NOHEMI, FEMUR, TROCHANTER;  Surgeon: Geovanni Norton MD;  Location: [facility] OR;  Service: Orthopedics;  Laterality: Left;       Review of patient's allergies indicates:   Allergen Reactions    Dilaudid [hydromorphone] Other (See Comments)     Family states "Delirium    Haldol [haloperidol lactate]        No current facility-administered medications on file prior to encounter.      Current Outpatient Medications on File Prior to Encounter   Medication Sig    allopurinol (ZYLOPRIM) 100 MG tablet Take 100 mg by mouth once daily.     cephALEXin (KEFLEX) 500 MG capsule Take 1 capsule (500 mg total) by mouth every 12 (twelve) hours. for 4 days    famotidine (PEPCID) 20 MG tablet Take 1 tablet (20 mg total) by mouth once daily.    heparin sodium,porcine (HEPARIN, PORCINE,) 5,000 unit/mL injection Inject 1 mL (5,000 Units total) into the skin every 8 (eight) hours. for 14 days    Lactobacillus rhamnosus GG (CULTERELLE) 5 billion cell PwPk packet Take 1 packet (1 each total) by mouth once daily.    meloxicam (MOBIC) 15 MG tablet Take 15 mg by mouth once daily.    oxyCODONE-acetaminophen (PERCOCET)  mg per tablet Take 1 tablet by mouth every Tues, Thurs, Sat. For pain in route to dialysis    sevelamer carbonate (RENVELA) 2.4 gram PwPk Take 2.4 g by mouth 3 (three) times daily with meals.    vit b cmplx 3-fa-vit c-biotin 1- mg-mg-mcg (NEPHRO-FOUZIA RX OR EQUIV) 1- mg-mg-mcg Tab Take 1 tablet by mouth nightly.    acetaminophen (TYLENOL) 325 MG tablet Take 2 tablets (650 mg total) by mouth every 8 (eight) hours as needed.    menthol-zinc oxide 0.2-20% 0.2-20 % Pste paste Apply topically 2 (two) times daily.    ondansetron (ZOFRAN-ODT) 8 MG TbDL Take 1 tablet (8 mg total) by mouth every 8 (eight) hours as needed.    oxyCODONE " (ROXICODONE) 5 MG immediate release tablet Take 1 tablet (5 mg total) by mouth every 4 (four) hours as needed for Pain.     Family History     None        Tobacco Use    Smoking status: Never Smoker    Smokeless tobacco: Never Used   Substance and Sexual Activity    Alcohol use: Never     Frequency: Never    Drug use: Never    Sexual activity: Not on file     Review of Systems   Unable to perform ROS: Dementia     Objective:     Vital Signs (Most Recent):  Temp: 97.7 °F (36.5 °C) (11/02/19 0000)  Pulse: 82 (11/02/19 0000)  Resp: 17 (11/02/19 0000)  BP: (!) 114/50 (11/02/19 0000)  SpO2: 96 % (11/01/19 1639) Vital Signs (24h Range):  Temp:  [97.7 °F (36.5 °C)-98.1 °F (36.7 °C)] 97.7 °F (36.5 °C)  Pulse:  [66-87] 82  Resp:  [11-18] 17  SpO2:  [95 %-99 %] 96 %  BP: (100-149)/(46-79) 114/50     Weight: 83.9 kg (185 lb)  Body mass index is 25.09 kg/m².    Physical Exam   Constitutional: Vital signs are normal. He appears well-developed and well-nourished. He is uncooperative. He has a sickly appearance.   HENT:   Head: Normocephalic and atraumatic.   Eyes: Pupils are equal, round, and reactive to light. Conjunctivae, EOM and lids are normal.   Neck: Trachea normal and normal range of motion. Neck supple.   Cardiovascular: Normal rate, regular rhythm, S1 normal, S2 normal, normal heart sounds, intact distal pulses and normal pulses.   Pulmonary/Chest: Effort normal and breath sounds normal.   Abdominal: Normal appearance and bowel sounds are normal.   Neurological: He is alert. GCS eye subscore is 4. GCS verbal subscore is 4. GCS motor subscore is 6.   Skin: Skin is warm, dry and intact. Capillary refill takes less than 2 seconds.   Psychiatric: His mood appears anxious. His speech is tangential. He is agitated.         CRANIAL NERVES     CN III, IV, VI   Pupils are equal, round, and reactive to light.  Extraocular motions are normal.        Significant Labs:   CMP:   Recent Labs   Lab 11/01/19  1345      K 5.8*       CO2 25   GLU 92   BUN 62*   CREATININE 9.0*   CALCIUM 10.1   PROT 6.6   ALBUMIN 2.6*   BILITOT 0.9   ALKPHOS 75   AST 22   ALT 10   ANIONGAP 11   EGFRNONAA 4.6*     Cardiac Markers:   Recent Labs   Lab 11/01/19  1345   *     Coagulation: No results for input(s): PT, INR, APTT in the last 48 hours.  Magnesium:   Recent Labs   Lab 11/01/19  1345   MG 2.6     Troponin:   Recent Labs   Lab 11/01/19  1345   TROPONINI 0.088*     Urine Studies:   Recent Labs   Lab 11/01/19  1600   COLORU Yellow   APPEARANCEUA Clear   PHUR 8.0   SPECGRAV 1.005   PROTEINUA 2+*   GLUCUA Negative   KETONESU Negative   BILIRUBINUA Negative   OCCULTUA Trace*   NITRITE Negative   UROBILINOGEN Negative   LEUKOCYTESUR Negative   RBCUA 1   WBCUA 2   BACTERIA Negative   SQUAMEPITHEL 1   HYALINECASTS 3*       Significant Imaging: I have reviewed all pertinent imaging results/findings within the past 24 hours.       X-ray Chest Ap Portable    Result Date: 11/1/2019  EXAMINATION: XR CHEST AP PORTABLE CLINICAL HISTORY: abnormal labs; Other malaise COMPARISON: 10/28/2019 FINDINGS: Cardiac silhouette size is stable from prior.  Prominent central pulmonary vascularity.  Low lung volumes.  No confluent airspace disease.  No large pleural effusion or pneumothorax.  Advanced degenerative changes of both shoulders.  Rightward tracheal deviation is stable compared to prior, potentially due to tortuous or enlarged aortic arch.     Radiographic findings suggesting pulmonary vascular congestion. Additional stable findings as above. Electronically signed by: Jason Juarez MD Date:    11/01/2019 Time:    15:27        Assessment/Plan:     * Anemia  2Units PRBC during HD  Follow H/H  No signs of obvious bleeding; likely secondary to ESRD  Mechanical VTE prophylaxis at this time as patient is high fall risk. Consider restarting anticoagulation in AM    Hyperkalemia  Treatment in the ED  HD today per Nephrology        ESRD (end stage renal disease) on  dialysis  Consult nephrology  Plan for HD today then start back to TTS schedule      VTE Risk Mitigation (From admission, onward)         Ordered     Place sequential compression device  Until discontinued      11/01/19 2102     Place GRACIA hose  Until discontinued      11/01/19 2101     Reason for No Pharmacological VTE Prophylaxis  Once      11/01/19 1753     IP VTE HIGH RISK PATIENT  Once      11/01/19 1753              This patient was assessed for admission on 11/1/19 by GABBY Montes NP  Department of Hospital Medicine   [facility]

## 2019-11-02 NOTE — PT/OT/SLP PROGRESS
Occupational Therapy      Patient Name:  Vinh Sanchez   MRN:  [id number]    Patient not seen today secondary to Dialysis. Will follow-up 11/3/2019.    Xavier Arana OT  11/2/2019

## 2019-11-02 NOTE — SUBJECTIVE & OBJECTIVE
"Past Medical History:   Diagnosis Date    Dialysis patient     Hemodialysis access, AV graft     SYDNEY    Renal disorder     ESRD on hemodialysis Tues, Thurs, Sat       Past Surgical History:   Procedure Laterality Date    AV FISTULA PLACEMENT      INTRAMEDULLARY RODDING OF TROCHANTER OF FEMUR Left 10/17/2019    Procedure: INSERTION, INTRAMEDULLARY NOHEMI, FEMUR, TROCHANTER;  Surgeon: Geovanni Norton MD;  Location: [facility];  Service: Orthopedics;  Laterality: Left;       Review of patient's allergies indicates:   Allergen Reactions    Dilaudid [hydromorphone] Other (See Comments)     Family states "Delirium    Haldol [haloperidol lactate]        No current facility-administered medications on file prior to encounter.      Current Outpatient Medications on File Prior to Encounter   Medication Sig    allopurinol (ZYLOPRIM) 100 MG tablet Take 100 mg by mouth once daily.     cephALEXin (KEFLEX) 500 MG capsule Take 1 capsule (500 mg total) by mouth every 12 (twelve) hours. for 4 days    famotidine (PEPCID) 20 MG tablet Take 1 tablet (20 mg total) by mouth once daily.    heparin sodium,porcine (HEPARIN, PORCINE,) 5,000 unit/mL injection Inject 1 mL (5,000 Units total) into the skin every 8 (eight) hours. for 14 days    Lactobacillus rhamnosus GG (CULTERELLE) 5 billion cell PwPk packet Take 1 packet (1 each total) by mouth once daily.    meloxicam (MOBIC) 15 MG tablet Take 15 mg by mouth once daily.    oxyCODONE-acetaminophen (PERCOCET)  mg per tablet Take 1 tablet by mouth every Tues, Thurs, Sat. For pain in route to dialysis    sevelamer carbonate (RENVELA) 2.4 gram PwPk Take 2.4 g by mouth 3 (three) times daily with meals.    vit b cmplx 3-fa-vit c-biotin 1- mg-mg-mcg (NEPHRO-FOUZIA RX OR EQUIV) 1- mg-mg-mcg Tab Take 1 tablet by mouth nightly.    acetaminophen (TYLENOL) 325 MG tablet Take 2 tablets (650 mg total) by mouth every 8 (eight) hours as needed.    menthol-zinc oxide 0.2-20% " 0.2-20 % Pste paste Apply topically 2 (two) times daily.    ondansetron (ZOFRAN-ODT) 8 MG TbDL Take 1 tablet (8 mg total) by mouth every 8 (eight) hours as needed.    oxyCODONE (ROXICODONE) 5 MG immediate release tablet Take 1 tablet (5 mg total) by mouth every 4 (four) hours as needed for Pain.     Family History     None        Tobacco Use    Smoking status: Never Smoker    Smokeless tobacco: Never Used   Substance and Sexual Activity    Alcohol use: Never     Frequency: Never    Drug use: Never    Sexual activity: Not on file     Review of Systems   Unable to perform ROS: Dementia     Objective:     Vital Signs (Most Recent):  Temp: 97.7 °F (36.5 °C) (11/02/19 0000)  Pulse: 82 (11/02/19 0000)  Resp: 17 (11/02/19 0000)  BP: (!) 114/50 (11/02/19 0000)  SpO2: 96 % (11/01/19 1639) Vital Signs (24h Range):  Temp:  [97.7 °F (36.5 °C)-98.1 °F (36.7 °C)] 97.7 °F (36.5 °C)  Pulse:  [66-87] 82  Resp:  [11-18] 17  SpO2:  [95 %-99 %] 96 %  BP: (100-149)/(46-79) 114/50     Weight: 83.9 kg (185 lb)  Body mass index is 25.09 kg/m².    Physical Exam   Constitutional: Vital signs are normal. He appears well-developed and well-nourished. He is uncooperative. He has a sickly appearance.   HENT:   Head: Normocephalic and atraumatic.   Eyes: Pupils are equal, round, and reactive to light. Conjunctivae, EOM and lids are normal.   Neck: Trachea normal and normal range of motion. Neck supple.   Cardiovascular: Normal rate, regular rhythm, S1 normal, S2 normal, normal heart sounds, intact distal pulses and normal pulses.   Pulmonary/Chest: Effort normal and breath sounds normal.   Abdominal: Normal appearance and bowel sounds are normal.   Neurological: He is alert. GCS eye subscore is 4. GCS verbal subscore is 4. GCS motor subscore is 6.   Skin: Skin is warm, dry and intact. Capillary refill takes less than 2 seconds.   Psychiatric: His mood appears anxious. His speech is tangential. He is agitated.         CRANIAL NERVES     CN  III, IV, VI   Pupils are equal, round, and reactive to light.  Extraocular motions are normal.        Significant Labs:   CMP:   Recent Labs   Lab 11/01/19  1345      K 5.8*      CO2 25   GLU 92   BUN 62*   CREATININE 9.0*   CALCIUM 10.1   PROT 6.6   ALBUMIN 2.6*   BILITOT 0.9   ALKPHOS 75   AST 22   ALT 10   ANIONGAP 11   EGFRNONAA 4.6*     Cardiac Markers:   Recent Labs   Lab 11/01/19  1345   *     Coagulation: No results for input(s): PT, INR, APTT in the last 48 hours.  Magnesium:   Recent Labs   Lab 11/01/19  1345   MG 2.6     Troponin:   Recent Labs   Lab 11/01/19  1345   TROPONINI 0.088*     Urine Studies:   Recent Labs   Lab 11/01/19  1600   COLORU Yellow   APPEARANCEUA Clear   PHUR 8.0   SPECGRAV 1.005   PROTEINUA 2+*   GLUCUA Negative   KETONESU Negative   BILIRUBINUA Negative   OCCULTUA Trace*   NITRITE Negative   UROBILINOGEN Negative   LEUKOCYTESUR Negative   RBCUA 1   WBCUA 2   BACTERIA Negative   SQUAMEPITHEL 1   HYALINECASTS 3*       Significant Imaging: I have reviewed all pertinent imaging results/findings within the past 24 hours.       X-ray Chest Ap Portable    Result Date: 11/1/2019  EXAMINATION: XR CHEST AP PORTABLE CLINICAL HISTORY: abnormal labs; Other malaise COMPARISON: 10/28/2019 FINDINGS: Cardiac silhouette size is stable from prior.  Prominent central pulmonary vascularity.  Low lung volumes.  No confluent airspace disease.  No large pleural effusion or pneumothorax.  Advanced degenerative changes of both shoulders.  Rightward tracheal deviation is stable compared to prior, potentially due to tortuous or enlarged aortic arch.     Radiographic findings suggesting pulmonary vascular congestion. Additional stable findings as above. Electronically signed by: Jason Juarez MD Date:    11/01/2019 Time:    15:27

## 2019-11-02 NOTE — PLAN OF CARE
"Confused, pulling at lines, pulled out iv. Daughter refused troponin at midnight and states "we checked his cardiac and he doesn't need it and it is unnecessary" also refusing vital signs and iv restart. Will comply with daughters directive.   "

## 2019-11-02 NOTE — PLAN OF CARE
Problem: Skin Injury Risk Increased  Goal: Skin Health and Integrity  Outcome: Ongoing, Progressing     Problem: Oral Intake Inadequate  Goal: Improved Oral Intake  Outcome: Ongoing, Progressing       Recommendation/Intervention:   1.) Add mechanical soft diet texture to aid PO intake  2.) Nepro BID (850 kcal, 38 g pro) to aid PO intake   3.) Provide meal assist and encouragement all meals

## 2019-11-03 NOTE — NURSING
Per Carol, patient's daughter, 0400 vitals were skipped as daughter would like for patient to not have rest disturbed.

## 2019-11-03 NOTE — PROGRESS NOTES
[facility] Medicine  Progress Note    Patient Name: Vinh Sanchez  MRN: [id number]  Patient Class: IP- Inpatient   Admission Date: 11/1/2019  Length of Stay: 2 days  Attending Physician: Cedric Caballero MD  Primary Care Provider: Alysha Elena MD        Subjective:     Principal Problem:Anemia    Interval History: Doing much better today. Patient denies any pain. Nephrology planning to do dialysis tomorrow. Talked to the daughter at bedside. Reported that he was in pain after therapy and needs pain medications so that he can participate in the exercise with PT. Planning for discharge tomorrow.     Review of Systems   Constitutional: Negative for fatigue and fever.   HENT: Negative for sore throat.    Respiratory: Negative for cough and shortness of breath.    Cardiovascular: Negative for chest pain and leg swelling.   Gastrointestinal: Negative for abdominal distention, abdominal pain, constipation, diarrhea, nausea and vomiting.   Genitourinary: Negative for decreased urine volume.     Objective:     Vital Signs (Most Recent):  Temp: 98 °F (36.7 °C) (11/03/19 1656)  Pulse: 80 (11/03/19 1656)  Resp: 19 (11/03/19 1656)  BP: (!) 93/54 (11/03/19 1656)  SpO2: (!) 94 % (11/03/19 1656) Vital Signs (24h Range):  Temp:  [97.8 °F (36.6 °C)-99.2 °F (37.3 °C)] 98 °F (36.7 °C)  Pulse:  [74-88] 80  Resp:  [18-19] 19  SpO2:  [90 %-94 %] 94 %  BP: ()/(47-78) 93/54     Weight: 83.9 kg (185 lb)  Body mass index is 25.09 kg/m².  No intake or output data in the 24 hours ending 11/03/19 1707   Physical Exam   Constitutional: No distress.   HENT:   Head: Atraumatic.   Cardiovascular: Normal rate, regular rhythm and normal heart sounds.   Pulmonary/Chest: Effort normal. He has no wheezes.   Abdominal: Soft. Bowel sounds are normal. He exhibits no distension and no mass. There is no tenderness. There is no guarding.   Neurological: He is alert.   Not oriented x 3   Skin: Skin is warm and dry.    Vitals reviewed.      Significant Labs: All pertinent labs within the past 24 hours have been reviewed.    Significant Imaging: I have reviewed and interpreted all pertinent imaging results/findings within the past 24 hours.    Assessment/Plan:      Active Diagnoses:    Diagnosis Date Noted POA    PRINCIPAL PROBLEM:  Anemia [D64.9] 11/01/2019 Yes    Hyperkalemia [E87.5] 11/01/2019 Yes    ESRD (end stage renal disease) on dialysis [N18.6, Z99.2] 10/16/2019 Not Applicable      Problems Resolved During this Admission:     VTE Risk Mitigation (From admission, onward)         Ordered     heparin (porcine) injection 5,000 Units  Every 8 hours      11/02/19 1823     IP VTE HIGH RISK PATIENT  Once      11/02/19 1823     Place sequential compression device  Until discontinued      11/01/19 2102     Place GRACIA hose  Until discontinued      11/01/19 2101     Reason for No Pharmacological VTE Prophylaxis  Once      11/01/19 1753               Anemia  Stable. Status post 2 units transfusion of packed red blood cells yesterday. Monitor hemoglobin.    Hyperkalemia  Resolved after dialysis.     ESRD (end stage renal disease) on dialysis:  Nephrology on board.  Plan is to start the dialysis back tomorrow.       Discharge Planning:  Patient mental status improved.  In terms of discharge planning,  patient came from [facility] and this is where he would like to go. Family wants to transfer the dialysis session to another center. This will be done tomorrow by S/w.  [facility] is not going to accept the patient without established dialysis center.  Discussed with on-call .     Face-to-Face encounter date: 11/03/2019     Encounter included review of the medical records, interviewing and examining the patient face-to-face, discussion with family and other health care providers, ordering and interpreting lab/test results and formulating a plan of care.      Medical Decision Making during this encounter was  [_] Low  Complexity  [x_] Moderate Complexity  [] High Complexity       Cedric Caballero MD  Department of Hospital Medicine   [facility]

## 2019-11-03 NOTE — PT/OT/SLP PROGRESS
Occupational Therapy      Patient Name:  Vinh Sanchez   MRN:  [id number]    Patient not seen today secondary to Patient fatigue. OT ritchieal attempted x3. On first attempt, pt sleeping and dtr asked not to wake him. Second attempt, pt soiled. Third attempt, pt sleeping and family refusing therapy stating they do not want to wake him. OT educated family on importance of activity, continued to refuse. Will follow-up 11/4/2019.    Sakina Azevedo OT  11/3/2019

## 2019-11-03 NOTE — PROGRESS NOTES
INPATIENT NEPHROLOGY PROGRESS  [facility] NEPHROLOGY    Patient Name: Vinh Sanchez  Date: 11/03/2019    Reason for consultation: ESRD    Chief Complaint:   Chief Complaint   Patient presents with    Abnormal Lab     pt needs dialysis     Consulted by Dr. Garcia.    History of Present Illness:  89 y/o M with hx of dementia and ESRD on HD TTS who was brought in by daughter for dialysis placement. Patient was previously dialyzed at [facility] by Dr. Griffin. He was recently hospitalized at [facility] and seen by Dr. Simental. He was discharged to [facility] and accepted to [facility]. He had HD at the hospital on Tues and went for his first HD treatment at [facility] yest. I spoke to the patient's daughter and HD staff today. Daughter said that patient had a dirty diaper and she thinks that is why unit did not want to see him. She said she spoke to Dr. Simental and she doesn't want her father to go to this unit. She couldn't really elaborate further but she was very upset and angry. She called [facility] and was told to take patient to the ER in order to get him placed at [facility]. The [facility] HD staff said patient was not scheduled to start yest. They did not have all of his information for intake. He is a large man who requires complete assistance for transfers. The patient did have a dirty diaper. It would have taken 4-5 staff members to try to get him to the bathroom to change him. They were also told by [facility] that patient is combative and feels like dialysis is a USP. They said they do not feel he is an appropriate candidate for their unit. My HD staff at the hospital contact HD staff at [facility] and was told patient will need restraints because he pulls out needles/lines.    11/02  Hgb improved post transfusion.  Pt seen today on dialysis.  BP dropping during treatment, RN having to turn off pull.  Pt alert, not talking.  11/03  Hgb 9, hypotensive most of the time.  Updated family on  POC.    Plan of Care:    Assessment:  Dialysis placement  ESRD  HTN  Hyperkalemia  SHPT  Anemia of CKD    Plan:    - This case will be a disposition challenge. From my standpoint, I am not sure why this 89 y/o man with dementia remains on dialysis. This is quite unfortunate. He is a safety risk to himself and others and it seems cruel to restrain him in order to do a therapy that he likely doesn't want and which doesn't offer much in the way of meaningful quantity and quality of life.  - I am not sure where this patient should be placed. I have consulted PT, OT and SW/CM. If patient requires full assistance for mobility, we would need to make sure [address] has alexis lifts, etc. He would also need to be controlled from a psychiatric standpoint. I cannot risk staff safety with respect to these major issues. Moreover, [facility] cannot send patients to dialysis with dirty diapers- this needs to be evaluated.  - I have ordered HD today due to hyperkalemia. Will then resume HD TTS tomorrow.  - BP/VS appears stable. I have ordered 2-3L UF.  - I have ordered a 2K bath.  - Check phos.  - Hb 6.2- will need 2 units of blood with HD today. I have ordered DENISE with HD TTS as well.   - I have ordered restraints with HD.    Thank you for allowing us to participate in this patient's care. We will continue to follow.    Vital Signs:  Temp Readings from Last 3 Encounters:   11/03/19 97.8 °F (36.6 °C) (Oral)   10/30/19 98.1 °F (36.7 °C)       Pulse Readings from Last 3 Encounters:   11/03/19 86   10/30/19 66       BP Readings from Last 3 Encounters:   11/03/19 111/60   10/30/19 (!) 98/50       Weight:  Wt Readings from Last 3 Encounters:   11/02/19 83.9 kg (185 lb)   10/27/19 86.2 kg (190 lb 0.6 oz)       Past Medical & Surgical History:  Past Medical History:   Diagnosis Date    Dialysis patient     Hemodialysis access, AV graft     SYDNEY    Renal disorder     ESRD on hemodialysis Tues, Thurs, Sat       Past Surgical  History:   Procedure Laterality Date    AV FISTULA PLACEMENT      INTRAMEDULLARY RODDING OF TROCHANTER OF FEMUR Left 10/17/2019    Procedure: INSERTION, INTRAMEDULLARY NOHEMI, FEMUR, TROCHANTER;  Surgeon: Geovanni Norton MD;  Location: [facility];  Service: Orthopedics;  Laterality: Left;       Past Social History:  Social History     Socioeconomic History    Marital status:      Spouse name: Not on file    Number of children: Not on file    Years of education: Not on file    Highest education level: Not on file   Occupational History    Not on file   Social Needs    Financial resource strain: Not on file    Food insecurity:     Worry: Not on file     Inability: Not on file    Transportation needs:     Medical: Not on file     Non-medical: Not on file   Tobacco Use    Smoking status: Never Smoker    Smokeless tobacco: Never Used   Substance and Sexual Activity    Alcohol use: Never     Frequency: Never    Drug use: Never    Sexual activity: Not on file   Lifestyle    Physical activity:     Days per week: Not on file     Minutes per session: Not on file    Stress: Not on file   Relationships    Social connections:     Talks on phone: Not on file     Gets together: Not on file     Attends Jewish service: Not on file     Active member of club or organization: Not on file     Attends meetings of clubs or organizations: Not on file     Relationship status: Not on file   Other Topics Concern    Not on file   Social History Narrative    Not on file       Medications:  No current facility-administered medications on file prior to encounter.      Current Outpatient Medications on File Prior to Encounter   Medication Sig Dispense Refill    allopurinol (ZYLOPRIM) 100 MG tablet Take 100 mg by mouth once daily.       cephALEXin (KEFLEX) 500 MG capsule Take 1 capsule (500 mg total) by mouth every 12 (twelve) hours. for 4 days 8 capsule 0    famotidine (PEPCID) 20 MG tablet Take 1 tablet (20 mg total)  by mouth once daily. 30 tablet 1    heparin sodium,porcine (HEPARIN, PORCINE,) 5,000 unit/mL injection Inject 1 mL (5,000 Units total) into the skin every 8 (eight) hours. for 14 days      Lactobacillus rhamnosus GG (CULTERELLE) 5 billion cell PwPk packet Take 1 packet (1 each total) by mouth once daily.      meloxicam (MOBIC) 15 MG tablet Take 15 mg by mouth once daily.      oxyCODONE-acetaminophen (PERCOCET)  mg per tablet Take 1 tablet by mouth every Tues, Thurs, Sat. For pain in route to dialysis      sevelamer carbonate (RENVELA) 2.4 gram PwPk Take 2.4 g by mouth 3 (three) times daily with meals. 216 g 11    vit b cmplx 3-fa-vit c-biotin 1- mg-mg-mcg (NEPHRO-FOUZIA RX OR EQUIV) 1- mg-mg-mcg Tab Take 1 tablet by mouth nightly.  0    acetaminophen (TYLENOL) 325 MG tablet Take 2 tablets (650 mg total) by mouth every 8 (eight) hours as needed.  0    menthol-zinc oxide 0.2-20% 0.2-20 % Pste paste Apply topically 2 (two) times daily.      ondansetron (ZOFRAN-ODT) 8 MG TbDL Take 1 tablet (8 mg total) by mouth every 8 (eight) hours as needed.      oxyCODONE (ROXICODONE) 5 MG immediate release tablet Take 1 tablet (5 mg total) by mouth every 4 (four) hours as needed for Pain. 20 tablet 0     Scheduled Meds:   allopurinol  100 mg Oral Daily    cephALEXin  500 mg Oral Q12H    epoetin fabi-ebpx (RETACRIT) injection  100 Units/kg Intravenous Every Tues, Thurs, Sat    famotidine  20 mg Oral Daily    heparin (porcine)  5,000 Units Subcutaneous Q8H    lactobacillus acidophilus & bulgar  1 packet Oral Daily    multivitamin  1 tablet Oral QHS    oxyCODONE-acetaminophen  1 tablet Oral Every Tues, Thurs, Sat    senna-docusate 8.6-50 mg  1 tablet Oral BID    sevelamer carbonate  2.4 g Oral TID WM    zinc oxide   Topical (Top) BID     Continuous Infusions:  PRN Meds:.    Allergies:  Dilaudid [hydromorphone] and Haldol [haloperidol lactate]    Past Family History:  Reviewed; refer to Hospitalist  Admission Note    Review of Systems:  Review of Systems - All 14 systems reviewed and negative, except as noted in HPI    Physical Exam:  General Appearance:    NAD, AA, appears stated age   Head:    Normocephalic, atraumatic   Eyes:    PER, EOMI, and conjunctiva/sclera clear bilaterally       Throat:   Moist mucus membranes   Lungs:     Clear to auscultation bilaterally, no wheezes, crackles, rales    or rhonchi, symmetric air movement, respirations unlabored   Chest wall:    No tenderness or deformity   Heart:    Regular rate and rhythm, S1 and S2 normal, no murmur, rub   or gallop   Abdomen:     Soft, non-tender, non-distended, bowel sounds active all four   quadrants, no RT or guarding, no masses, no organomegaly   Extremities:   Warm and well perfused, distal pulses are intact, no             cyanosis or peripheral edema   MSK:   No joint or muscle swelling, tenderness or deformity   Skin:   Skin color, texture, turgor normal, no rashes or lesions   Neurologic/Psychiatric:   Unable to assess, dementia, calm afect     Results:  Lab Results   Component Value Date     11/03/2019    K 4.6 11/03/2019    CL 99 11/03/2019    CO2 30 (H) 11/03/2019    BUN 40 (H) 11/03/2019    CREATININE 6.0 (H) 11/03/2019    CALCIUM 10.1 11/03/2019    ANIONGAP 10 11/03/2019    ESTGFRAFRICA 8.6 (A) 11/03/2019    EGFRNONAA 7.5 (A) 11/03/2019       Lab Results   Component Value Date    CALCIUM 10.1 11/03/2019    PHOS 4.1 11/03/2019       Recent Labs   Lab 11/03/19  0608   WBC 12.18   RBC 2.81*   HGB 9.0*   HCT 29.3*      *   MCH 32.0*   MCHC 30.7*       I have personally reviewed pertinent radiological imaging and reports.    I have spent > 70 minutes providing care for this patient for the above diagnoses. These services have included chart/data/imaging review, evaluation, exam, formulation of plan, , note preparation, and discussions with staff involved in this patient's care.    Conrad Deleon MD  MPH  [facility]  [address] [address]  [phone] (p)  [phone] (f)

## 2019-11-03 NOTE — PLAN OF CARE
Problem: Physical Therapy Goal  Goal: Physical Therapy Goal  Description  Goals to be met by: 2019     Patient will increase functional independence with mobility by performin. Supine to sit with Moderate Assistance  2. Sit to stand transfer with Maximum Assistance PWB LLE  3. Bed to chair transfer with Maximum Assistance using Rolling Walker PWB LLE  4. Sitting at edge of bed x20 minutes with Minimal Assistance for static sitting  5. Lower extremity exercise program x20 reps   Outcome: Ongoing, Progressing   PT eval and treat completed. Thera ex in supine- pt sleepy with c/o pain with movements/ arthritic knees. EOB sitting max assist. Standing with max assist x2    3 attempts. Pt total assist for mobility. SNF. Pt allowed PWB LLE s/p IM rodding L femur 10- at [facility]

## 2019-11-03 NOTE — PT/OT/SLP EVAL
Physical Therapy Evaluation    Patient Name:  Vinh Sanchez   MRN:  [id number]    Recommendations:     Discharge Recommendations:  nursing facility, skilled   Discharge Equipment Recommendations: none   Barriers to discharge: Decreased caregiver support    Assessment:     Vinh Sanchez is a 89 y.o. male admitted with a medical diagnosis of Anemia.  He presents with the following impairments/functional limitations:  weakness, impaired endurance, impaired self care skills, impaired functional mobilty, gait instability, impaired balance, impaired cognition, decreased upper extremity function, decreased lower extremity function, decreased safety awareness, pain, decreased ROM, orthopedic precautions . Pt with recent L IM nailing L femur  10/17/2019 and allowed PWB. Pt currently sleepy and requiring max to total assist for EOB sitting/standing. Pt's daughter at bedside and hands on taking care of pt. Pt to benefit from continued therapies at SNF    Rehab Prognosis: Fair; patient would benefit from acute skilled PT services to address these deficits and reach maximum level of function.    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 [facility] parking lot in [place]  Daughter stated pt needing his arthritic medicine prior to therapy tomorrow  Chief Complaint: pain L hand/both knees- yells to movement  Patient/Family Comments/goals: daughters goal is to get pt well/walking  Pain/Comfort:  · Pain Rating 1: (did not rate)  · Location - Side 1:  Bilateral  · Location 1: knee  · Pain Addressed 1: Pre-medicate for activity, Reposition, Distraction, Cessation of Activity, Nurse notified    Patients cultural, spiritual, Bahai conflicts given the current situation:      Living Environment:  Pt lived at home with spouse- daughter stated is 10 yrs younger than him and is well  Prior to admission, patients level of function was indep prior to Oct 16.  Equipment used at home: none.  DME owned (not currently used): none.  Upon discharge, patient will have assistance from family.    Objective:     Communicated with nurse Marielena/charge nurse Richy prior to session.  Patient found HOB elevated with telemetry, bed alarm, pressure relief boots  upon PT entry to room.    General Precautions: Standard, fall   Orthopedic Precautions:LLE partial weight bearing   Braces: N/A     Exams:  · Postural Exam:  Patient presented with the following abnormalities:    · -       Rounded shoulders  · -       Forward head  · -       Lateral weight shift of hips  · -       Kyphosis  · -       R lateral lean  · RLE ROM: WFL except painful knees  · RLE Strength: Deficits: 2+/5  · LLE ROM: WFL except painful knee  · LLE Strength: Deficits: 2+/5    Functional Mobility:  · Bed Mobility:     · Scooting: total assistance  · Supine to Sit: maximal assistance  · Sit to Supine: maximal assistance and of 2 persons  · Transfers:     · Sit to Stand:  maximal assistance and of 2 persons with hand-held assist and rolling walker   · Pt has difficulty gripping onto RW      Therapeutic Activities and Exercises:   thera ex with AP, gentle abd/add,HS,SLR  EOB sitting, completed LAQ with maximum cueing to attend to task. Assist for static sitting balance  Sit to stands with max  Assist x2- unable to comprehend PWB LLE  Back to bed and repositioned with assist from daughter + BM- nurse aware    AM-PAC 6 CLICK MOBILITY  Total Score:10     Patient left HOB elevated with all lines intact, call button in reach,  nurse Marielena notified and daughter present.    GOALS:   Multidisciplinary Problems     Physical Therapy Goals        Problem: Physical Therapy Goal    Goal Priority Disciplines Outcome Goal Variances Interventions   Physical Therapy Goal     PT, PT/OT Ongoing, Progressing     Description:  Goals to be met by: 2019     Patient will increase functional independence with mobility by performin. Supine to sit with Moderate Assistance  2. Sit to stand transfer with Maximum Assistance PWB LLE  3. Bed to chair transfer with Maximum Assistance using Rolling Walker PWB LLE  4. Sitting at edge of bed x20 minutes with Minimal Assistance for static sitting  5. Lower extremity exercise program x20 reps                    History:     Past Medical History:   Diagnosis Date    Dialysis patient     Hemodialysis access, AV graft     SYDNEY    Renal disorder     ESRD on hemodialysis Tues, Thurs, Sat       Past Surgical History:   Procedure Laterality Date    AV FISTULA PLACEMENT      INTRAMEDULLARY RODDING OF TROCHANTER OF FEMUR Left 10/17/2019    Procedure: INSERTION, INTRAMEDULLARY NOHEMI, FEMUR, TROCHANTER;  Surgeon: Geovanni Norton MD;  [facility];  Service: Orthopedics;  Laterality: Left;       Time Tracking:     PT Received On: 19  PT Start Time: 1044     PT Stop Time: 1122  PT Total Time (min): 38 min     Billable Minutes: Evaluation 10 and Therapeutic Activity 28      Raquel Burgos, PT  2019

## 2019-11-03 NOTE — PLAN OF CARE
Problem: Adult Inpatient Plan of Care  Goal: Plan of Care Review  Outcome: Ongoing, Progressing     Problem: Adult Inpatient Plan of Care  Goal: Patient-Specific Goal (Individualization)  Outcome: Ongoing, Progressing     Problem: Adult Inpatient Plan of Care  Goal: Absence of Hospital-Acquired Illness or Injury  Outcome: Ongoing, Progressing     Problem: Adult Inpatient Plan of Care  Goal: Optimal Comfort and Wellbeing  Outcome: Ongoing, Progressing     Problem: Fall Injury Risk  Goal: Absence of Fall and Fall-Related Injury  Outcome: Ongoing, Progressing     Problem: Restraint, Nonbehavioral (Nonviolent)  Goal: Personal Dignity and Safety Maintained  Outcome: Ongoing, Progressing     Problem: Infection (Hemodialysis)  Goal: Absence of Infection Signs/Symptoms  Outcome: Ongoing, Progressing     Problem: Skin Injury Risk Increased  Goal: Skin Health and Integrity  Outcome: Ongoing, Progressing     Problem: Oral Intake Inadequate  Goal: Improved Oral Intake  Outcome: Ongoing, Progressing

## 2019-11-04 NOTE — NURSING
Pot care assumed. Pt resting but family member very difficult to work with. Family member insisted that she give pt meds due herself. Pt is pleasant however.

## 2019-11-04 NOTE — PT/OT/SLP EVAL
Occupational Therapy   Evaluation    Name: Vinh Sanchez  MRN: [id number]  Admitting Diagnosis:  Anemia      Recommendations:     Discharge Recommendations: nursing facility, skilled  Discharge Equipment Recommendations:  (tbd)  Barriers to discharge:       Assessment:     Vinh Sanchez is a 89 y.o. male with a medical diagnosis of Anemia.  Pt agreeable to OT therapy session. Performance deficits affecting function: weakness, impaired endurance, impaired self care skills, impaired functional mobilty, gait instability, impaired balance, decreased lower extremity function, decreased safety awareness, orthopedic precautions.  Pt oriented to person only during session. Pt's cousin present during session. Pt follow's commands inconsistently, requiring max verbal/tactile cues to complete g/h task bed level. Family present unable to give complete history. Rec SNF at d/c.    Rehab Prognosis: Fair; patient would benefit from acute skilled OT services to address these deficits and reach maximum level of function.       Plan:     Patient to be seen 3 x/week to address the above listed problems via self-care/home management, therapeutic activities, therapeutic exercises  · Plan of Care Expires: 12/04/19  · Plan of Care Reviewed with: patient, family    Subjective     Chief Complaint: none stated  Patient/Family Comments/goals: to get better    Occupational Profile: History obtained from family member present and chart review.  Living Environment: Pt lives with wife, but recently, patient has been staying at [facility] following a fall resulting in a hip fracture/hip surgery in October.  Previous level of function: Independent ADLs.   Roles and Routines: ; drives   Equipment Used at Home:  none  Assistance upon Discharge: yes, from family    Pain/Comfort:  · Pain Rating 1: (did not verbalize any pain)    Patients cultural, spiritual, Yazidi conflicts given the current situation:      Objective:     Communicated with:  nursing prior to session.  Patient found HOB elevated with bed alarm, telemetry upon OT entry to room.    General Precautions: Standard, fall   Orthopedic Precautions:LLE partial weight bearing   Braces: N/A     Occupational Performance:    Bed Mobility:    · Patient completed Scooting/Bridging with total assistance and 2 persons    Activities of Daily Living:  · Grooming: maximal assistance pt requiried max verbal/tactile cues and [place] assist to bring hand to face to wash face with towel    Cognitive/Visual Perceptual:  Cognitive/Psychosocial Skills:     -       Oriented to: Person   -       Follows Commands/attention:Easily distracted, Follows one-step commands and inconsistently   -       Communication: clear/fluent  -       Memory: deficits  -       Safety awareness/insight to disability: impaired   -       Mood/Affect/Coping skills/emotional control: Cooperative, Lethargic and Pleasant    Physical Exam:  Dominant hand:    -       right handed  Upper Extremity Range of Motion:     -       Right Upper Extremity: WFL PROM/AROM  -       Left Upper Extremity: WFL PROM; Unable to assess AROM 2/2 decreased cognition, increased lethargy  Upper Extremity Strength:    -       Right/Left Upper Extremity: Unable to assess 2/2 decreased cognition/increased lethargy   Strength:    -       Right/Left Upper Extremity: Unable to assess 2/2 decreased cognition/increased lethargy     AMPAC 6 Click ADL:  AMPAC Total Score: 12    Treatment & Education:  Pt/family educated on role of OT/POC  Education:    Patient left HOB elevated with all lines intact, call button in reach, bed alarm on, nursing notified and family present    GOALS:   Multidisciplinary Problems     Occupational Therapy Goals        Problem: Occupational Therapy Goal    Goal Priority Disciplines Outcome Interventions   Occupational Therapy Goal     OT, PT/OT     Description:  Goals to be met by: discharge     Patient will increase functional independence with  ADLs by performing:    Grooming while EOB with Minimal Assistance.  Sitting at edge of bed x15 minutes with Minimal Assistance.  Supine to sit with Moderate Assistance.  Toilet transfer to bedside commode with Maximum Assistance with RW while maintaining LLE precautions.                      History:     Past Medical History:   Diagnosis Date    Dialysis patient     Hemodialysis access, AV graft     SYDNEY    Renal disorder     ESRD on hemodialysis Tues, Thurs, Sat       Past Surgical History:   Procedure Laterality Date    AV FISTULA PLACEMENT      INTRAMEDULLARY RODDING OF TROCHANTER OF FEMUR Left 10/17/2019    Procedure: INSERTION, INTRAMEDULLARY NOHEMI, FEMUR, TROCHANTER;  Surgeon: Geovanni Norton MD;  Location: [facility];  Service: Orthopedics;  Laterality: Left;       Time Tracking:     OT Date of Treatment: 11/04/19  OT Start Time: 1351  OT Stop Time: 1405  OT Total Time (min): 14 min    Billable Minutes:Evaluation 14    Ananya Isaacs OT  11/4/2019

## 2019-11-04 NOTE — PLAN OF CARE
Problem: Physical Therapy Goal  Goal: Physical Therapy Goal  Description  Goals to be met by: 2019     Patient will increase functional independence with mobility by performin. Supine to sit with Moderate Assistance  2. Sit to stand transfer with Maximum Assistance PWB LLE  3. Bed to chair transfer with Maximum Assistance using Rolling Walker PWB LLE  4. Sitting at edge of bed x20 minutes with Minimal Assistance for static sitting  5. Lower extremity exercise program x20 reps   Outcome: Ongoing, Progressing   Pt is showing progress with  sitting tolerence at EOB.

## 2019-11-04 NOTE — PROGRESS NOTES
[facility] Medicine    Progress Note    Patient Name: Vinh Sanchez  MRN: [id number]  Patient Class: IP- Inpatient   Admission Date: 11/1/2019  1:23 PM  Length of Stay: 3  Attending Physician: Cedric Caballero MD  Primary Care Provider: Alysha Elena MD  Face-to-Face encounter date: 11/04/2019  Chief Complaint: dialysis needs to be arranged    Following encounter included review of the medical records, interviewing and examining the patient face-to-face, discussion with family and other health care providers, ordering and interpreting lab/test results and formulating a plan of care.         Medical Decision Making:      [_] Low Complexity  [*] Moderate Complexity  [ ] High Complexity       Patient ID: Vinh Sanchez is a 89 y.o. male. Admitted for   Active Hospital Problems    Diagnosis  POA    *Anemia [D64.9]  Yes    Hyperkalemia [E87.5]  Yes    ESRD (end stage renal disease) on dialysis [N18.6, Z99.2]  Not Applicable      Resolved Hospital Problems   No resolved problems to display.          Assessment & Plan:      ESRD (end stage renal disease) on dialysis:  Nephrology on board.  Dialysis on Tuesday after which he can be discharged      Anemia  Stable. Status post 2 units transfusion of packed red blood cells yesterday. Monitor hemoglobin.     Hyperkalemia  Resolved after dialysis.     Arthritis: Patient required 1 dose of toradol yesterday. Patient did get benefit and was able to participate in PT today. Today I was called by the pharmacy that as per hospital policy they dont allow toradol in patient age above 65. I have changed it to Celebrex. Patients daughter in agreement.         Discharge Planning:     Patient mental status improved.  In terms of discharge planning,  s/w working on arranging dialysis in another [facility] facility. If arranged, patient can be discharged tomorrow.    Subjective:    Interval History: Patient is doing well. No concerns/issues overnight reported by  the patient or the nursing staff.      Review of Systems   Constitutional: Negative for fatigue and fever.   HENT: Negative for sore throat.    Respiratory: Negative for cough and shortness of breath.    Cardiovascular: Negative for chest pain and leg swelling.   Gastrointestinal:  Negative for abdominal distention, constipation, diarrhea, nausea and vomiting.   Genitourinary: Negative for decreased urine volume.     Objective:     Physical Exam  Vitals:    11/04/19 1225   BP: (!) 110/56   Pulse: 68   Resp: 18   Temp: 98.6 °F (37 °C)     Vitals reviewed.  Constitutional: No distress.   HENT:   Head: Atraumatic.   Cardiovascular: Normal rate, regular rhythm and normal heart sounds.   Pulmonary/Chest: Effort normal. She has no wheezes.   Abdominal: Soft. Bowel sounds are normal. She exhibits no distension and no mass. No tenderness  Neurological: Alert.   Skin: Skin is warm and dry.     Following labs were Reviewed during my Encounter.    CBC:  Recent Labs   Lab 11/04/19  0540   WBC 11.79   HGB 9.1*   HCT 29.5*        CMP:  Recent Labs   Lab 11/04/19  0540   CALCIUM 10.1   ALBUMIN 2.5*   PROT 6.7      K 4.6   CO2 30*   CL 97   BUN 50*   CREATININE 7.3*   ALKPHOS 72   ALT 11   AST 21   BILITOT 1.1*     Last 72 hour POCT GLUCOSE  No results found for: POCTGLUCOSE     Microbiology Results (last 7 days)     ** No results found for the last 168 hours. **            X-Ray Chest AP Portable   Final Result      Radiographic findings suggesting pulmonary vascular congestion.      Additional stable findings as above.         Electronically signed by: Jason Juarez MD   Date:    11/01/2019   Time:    15:27            Scheduled Meds:   allopurinol  100 mg Oral Daily    cephALEXin  500 mg Oral Q12H    dextromethorphan-guaifenesin  mg  1 tablet Oral BID    epoetin fabi-ebpx (RETACRIT) injection  100 Units/kg Intravenous Every Tues, Thurs, Sat    famotidine  20 mg Oral Daily    heparin (porcine)  5,000  Units Subcutaneous Q8H    lactobacillus acidophilus & bulgar  1 packet Oral Daily    multivitamin  1 tablet Oral QHS    oxyCODONE-acetaminophen  1 tablet Oral Every Tues, Thurs, Sat    senna-docusate 8.6-50 mg  1 tablet Oral BID    sevelamer carbonate  2.4 g Oral TID WM    zinc oxide   Topical (Top) BID     Continuous Infusions:  PRN Meds:.sodium chloride, acetaminophen, ondansetron, ondansetron, oxyCODONE, sodium chloride 0.9%

## 2019-11-04 NOTE — PLAN OF CARE
11/04/19 1018   Patient Assessment/Suction   Level of Consciousness (AVPU) alert   Respiratory Effort Unlabored   All Lung Fields Breath Sounds clear   Cough Type nonproductive   PRE-TX-O2   O2 Device (Oxygen Therapy) room air   SpO2 97 %   Pulse Oximetry Type Intermittent   $ Pulse Oximetry - Multiple Charge Pulse Oximetry - Multiple   Pulse 67   Resp 18   Vibratory PEP Therapy   $ Vibratory PEP Charges Aerobika Therapy   $ Vibratory PEP Equipment Aerobika Equipment   $ Vibratory PEP Tech Time Charges 15 min   Daily Review of Necessity (PEP Therapy) completed   Type (PEP Therapy) vibratory/oscillatory   Device (PEP Therapy) flutter   Route (PEP Therapy) mouthpiece   Breaths per Cycle (PEP Therapy) 10   Cycles (PEP Therapy) 1   Patient Position (PEP Therapy) semi-King's   Signs of Intolerance (PEP Therapy) none

## 2019-11-04 NOTE — PT/OT/SLP PROGRESS
"Physical Therapy Treatment    Patient Name:  Vinh Sanchez   MRN:  [id number]    Recommendations:     Discharge Recommendations:  nursing facility, skilled   Discharge Equipment Recommendations: none   Barriers to discharge: None    Assessment:     Vinh Sanchez is a 89 y.o. male admitted with a medical diagnosis of Anemia.  He presents with the following impairments/functional limitations:  weakness, impaired endurance, impaired functional mobilty, gait instability, impaired balance, decreased coordination, decreased lower extremity function, decreased safety awareness . Pt  was not combative today. Pt was confused and had to return to bed after sitting in w/c for 12 minutes as pt was unsafe  and slid forward repeatedly.  Pt  required Total A x2 for t/f bed<>W/C. Pt's daughter was  educated on the need for  seeking assistance for pt t/f OOB .  Pt explained that she tried "to see what he could do," as daughter assisted pt to EOB with attempt to t/f to stand.    Rehab Prognosis: Poor; patient would benefit from acute skilled PT services to address these deficits and reach maximum level of function.    Recent Surgery: * No surgery found *      Plan:     During this hospitalization, patient to be seen 6 x/week to address the identified rehab impairments via therapeutic activities, therapeutic exercises and progress toward the following goals:    · Plan of Care Expires:  11/29/19    Subjective     Chief Complaint:   Patient/Family Comments/goals: ANF  Pain/Comfort:  ·        Objective:     Communicated with nurse prior to session.  Patient found supine with bed alarm, telemetry, pressure relief boots upon PT entry to room.     General Precautions: Standard, fall   Orthopedic Precautions:LLE partial weight bearing   Braces:       Functional Mobility:  · Bed Mobility:     · Supine to Sit: maximal assistance  · Transfers:     · Bed to Chair: total assistance and of 2 persons with  no AD  using  [facility]      AM-PAC 6 CLICK " MOBILITY  Turning over in bed (including adjusting bedclothes, sheets and blankets)?: 2  Sitting down on and standing up from a chair with arms (e.g., wheelchair, bedside commode, etc.): 2  Moving from lying on back to sitting on the side of the bed?: 2  Moving to and from a bed to a chair (including a wheelchair)?: 2  Need to walk in hospital room?: 1  Climbing 3-5 steps with a railing?: 1  Basic Mobility Total Score: 10       Therapeutic Activities and Exercises:  T/f training bed<>W/C and 1x10 reps of  L LE AAROM with minimal arc of movement  for  Knee ROM    Patient left supine with call button in reach, bed alarm on and daughter present present..    GOALS:   Multidisciplinary Problems     Physical Therapy Goals        Problem: Physical Therapy Goal    Goal Priority Disciplines Outcome Goal Variances Interventions   Physical Therapy Goal     PT, PT/OT Ongoing, Progressing     Description:  Goals to be met by: 2019     Patient will increase functional independence with mobility by performin. Supine to sit with Moderate Assistance  2. Sit to stand transfer with Maximum Assistance PWB LLE  3. Bed to chair transfer with Maximum Assistance using Rolling Walker PWB LLE  4. Sitting at edge of bed x20 minutes with Minimal Assistance for static sitting  5. Lower extremity exercise program x20 reps                    Time Tracking:     PT Received On: 19  PT Start Time: 1105     PT Stop Time: 1155  PT Total Time (min): 50 min     Billable Minutes: Therapeutic Activity 24 mins and Therapeutic Exercise 26    Treatment Type: Treatment  PT/PTA: PT     PTA Visit Number: 0     Helen Elam, PERLA  2019

## 2019-11-04 NOTE — PROGRESS NOTES
INPATIENT NEPHROLOGY PROGRESS NOTE  [facility] NEPHROLOGY    Patient Name: Vinh Sanchez  Date: 11/04/2019    Reason for consultation: ESRD    Chief Complaint:   Chief Complaint   Patient presents with    Abnormal Lab     pt needs dialysis       History of Present Illness:  89 y/o M with hx of dementia and ESRD on HD TTS who was brought in by daughter for dialysis placement. Patient was previously dialyzed at [facility] by Dr. Griffin. He was recently hospitalized at [facility] and seen by Dr. Simental. He was discharged to [facility] and accepted to [facility]. He had HD at the hospital on Tues and went for his first HD treatment at [facility] yest. I spoke to the patient's daughter and HD staff today. Daughter said that patient had a dirty diaper and she thinks that is why unit did not want to see him. She said she spoke to Dr. Simental and she doesn't want her father to go to this unit. She couldn't really elaborate further but she was very upset and angry. She called [facility] and was told to take patient to the ER in order to get him placed at [facility]. The [facility] HD staff said patient was not scheduled to start yest. They did not have all of his information for intake. He is a large man who requires complete assistance for transfers. The patient did have a dirty diaper. It would have taken 4-5 staff members to try to get him to the bathroom to change him. They were also told by [facility] that patient is combative and feels like dialysis is a care home. They said they do not feel he is an appropriate candidate for their unit. My HD staff at the hospital contact HD staff at [facility] and was told patient will need restraints because he pulls out needles/lines. We are consulted for dialysis.    · Interval History/Subjective:    - calm, pleasant right now  - SBP , on RA    · Review of Systems: unable to obtain due to dementia  ·   Plan of Care:    Assessment:  Dialysis  placement  ESRD  HTN  SHPT  Anemia of CKD     Plan:     - Continue HD TTS with restraints. Awaiting PT/OT and SW/CM input. I have significant reservations about outpatient dialysis placement due to dementia and immobility from both an ethical as well as patient safety standpoint.   - BP/VS are stable. Continue 1-3L UF as tolerated.  - Phos is at goal. Continue binder.  - Hb is improved after blood transfusion this admission, remains stable over the last 2 days. Continue DENISE with HD TTS.    Updated nephew at bedside about my concerns.  He told me that he had to go to [facility] and help restrain patient several times due to agitation in order for him to get dialysis.     Thank you for allowing us to participate in this patient's care. We will continue to follow.    Medications:  No current facility-administered medications on file prior to encounter.      Current Outpatient Medications on File Prior to Encounter   Medication Sig Dispense Refill    allopurinol (ZYLOPRIM) 100 MG tablet Take 100 mg by mouth once daily.       [] cephALEXin (KEFLEX) 500 MG capsule Take 1 capsule (500 mg total) by mouth every 12 (twelve) hours. for 4 days 8 capsule 0    famotidine (PEPCID) 20 MG tablet Take 1 tablet (20 mg total) by mouth once daily. 30 tablet 1    heparin sodium,porcine (HEPARIN, PORCINE,) 5,000 unit/mL injection Inject 1 mL (5,000 Units total) into the skin every 8 (eight) hours. for 14 days      Lactobacillus rhamnosus GG (CULTERELLE) 5 billion cell PwPk packet Take 1 packet (1 each total) by mouth once daily.      meloxicam (MOBIC) 15 MG tablet Take 15 mg by mouth once daily.      oxyCODONE-acetaminophen (PERCOCET)  mg per tablet Take 1 tablet by mouth every Tues, Thurs, Sat. For pain in route to dialysis      sevelamer carbonate (RENVELA) 2.4 gram PwPk Take 2.4 g by mouth 3 (three) times daily with meals. 216 g 11    vit b cmplx 3-fa-vit c-biotin 1- mg-mg-mcg (NEPHRO-FOUZIA RX OR EQUIV) 1-  mg-mg-mcg Tab Take 1 tablet by mouth nightly.  0    acetaminophen (TYLENOL) 325 MG tablet Take 2 tablets (650 mg total) by mouth every 8 (eight) hours as needed.  0    menthol-zinc oxide 0.2-20% 0.2-20 % Pste paste Apply topically 2 (two) times daily.      ondansetron (ZOFRAN-ODT) 8 MG TbDL Take 1 tablet (8 mg total) by mouth every 8 (eight) hours as needed.      oxyCODONE (ROXICODONE) 5 MG immediate release tablet Take 1 tablet (5 mg total) by mouth every 4 (four) hours as needed for Pain. 20 tablet 0     Scheduled Meds:   allopurinol  100 mg Oral Daily    cephALEXin  500 mg Oral Q12H    dextromethorphan-guaifenesin  mg  1 tablet Oral BID    epoetin fabi-ebpx (RETACRIT) injection  100 Units/kg Intravenous Every Tues, Thurs, Sat    famotidine  20 mg Oral Daily    heparin (porcine)  5,000 Units Subcutaneous Q8H    lactobacillus acidophilus & bulgar  1 packet Oral Daily    multivitamin  1 tablet Oral QHS    oxyCODONE-acetaminophen  1 tablet Oral Every Tues, Thurs, Sat    senna-docusate 8.6-50 mg  1 tablet Oral BID    sevelamer carbonate  2.4 g Oral TID WM    zinc oxide   Topical (Top) BID     Continuous Infusions:  PRN Meds:.sodium chloride, acetaminophen, ondansetron, ondansetron, oxyCODONE, sodium chloride 0.9%    Allergies:  Dilaudid [hydromorphone] and Haldol [haloperidol lactate]    Vital Signs:  Temp Readings from Last 3 Encounters:   11/04/19 98.6 °F (37 °C) (Oral)   10/30/19 98.1 °F (36.7 °C)       Pulse Readings from Last 3 Encounters:   11/04/19 68   10/30/19 66       BP Readings from Last 3 Encounters:   11/04/19 (!) 110/56   10/30/19 (!) 98/50       Weight:  Wt Readings from Last 3 Encounters:   11/02/19 83.9 kg (185 lb)   10/27/19 86.2 kg (190 lb 0.6 oz)       Physical Exam:  Constitutional: nad, aao x 1  Heart: rrr, no m/r/g, wwp, no edema  Lungs: ctab, no w/r/r/c, no lb  Abdomen: s/nt/nd, +BS    Results:  Lab Results   Component Value Date     11/04/2019    K 4.6 11/04/2019     CL 97 11/04/2019    CO2 30 (H) 11/04/2019    BUN 50 (H) 11/04/2019    CREATININE 7.3 (H) 11/04/2019    CALCIUM 10.1 11/04/2019    ANIONGAP 10 11/04/2019    ESTGFRAFRICA 6.8 (A) 11/04/2019    EGFRNONAA 5.9 (A) 11/04/2019       Lab Results   Component Value Date    CALCIUM 10.1 11/04/2019    PHOS 4.2 11/04/2019       Recent Labs   Lab 11/04/19  0540   WBC 11.79   RBC 2.85*   HGB 9.1*   HCT 29.5*      *   MCH 31.9*   MCHC 30.8*       I have personally reviewed pertinent radiological imaging and reports.    I have spent > 35 minutes providing care for this patient for the above diagnoses. These services have included chart/data/imaging review, evaluation, exam, formulation of plan, , note preparation, and discussions with staff involved in this patient's care.    Conrad Deleon MD MPH  [facility] [address]  [place] [address]  [phone] (p)  [phone] (f)

## 2019-11-04 NOTE — CARE UPDATE
"Patient is a resident of [facility] in [place], however he cannot return until he has "confirmed" chair time at [facility] [facility]). I called [facility]([phone]) and they informed me that they are accepting him and has "tentative" chair time for TTS @ 11:30am. I also was informed by [facility] that I must treat this as a "new" referral and fax everything to the central intake. [facility] m[facility]kee says that patient will need a VA auth, which could take a few days. But once patient's insurance is all verified and the VA auth is given they will accept him.  "

## 2019-11-04 NOTE — PLAN OF CARE
Assisted to floor last night by daughter, no injury. Notified Dr. Lindsay, no new orders. Daughter who is POA at bedside. No pain or complaint reported by patient. No other problems or events.

## 2019-11-05 PROBLEM — M19.90 OSTEOARTHRITIS: Status: ACTIVE | Noted: 2019-01-01

## 2019-11-05 PROBLEM — R45.1 AGITATION: Status: RESOLVED | Noted: 2019-01-01 | Resolved: 2019-01-01

## 2019-11-05 PROBLEM — N18.6 ANEMIA DUE TO CHRONIC KIDNEY DISEASE, ON CHRONIC DIALYSIS: Status: ACTIVE | Noted: 2019-01-01

## 2019-11-05 PROBLEM — F03.90 DEMENTIA WITHOUT BEHAVIORAL DISTURBANCE: Status: ACTIVE | Noted: 2019-01-01

## 2019-11-05 PROBLEM — D72.829 LEUKOCYTOSIS: Status: RESOLVED | Noted: 2019-01-01 | Resolved: 2019-01-01

## 2019-11-05 PROBLEM — D63.1 ANEMIA DUE TO CHRONIC KIDNEY DISEASE, ON CHRONIC DIALYSIS: Status: ACTIVE | Noted: 2019-01-01

## 2019-11-05 PROBLEM — Z99.2 ANEMIA DUE TO CHRONIC KIDNEY DISEASE, ON CHRONIC DIALYSIS: Status: ACTIVE | Noted: 2019-01-01

## 2019-11-05 PROBLEM — G89.18 POST-OP PAIN: Status: RESOLVED | Noted: 2019-01-01 | Resolved: 2019-01-01

## 2019-11-05 NOTE — PLAN OF CARE
Problem: Physical Therapy Goal  Goal: Physical Therapy Goal  Description  Goals to be met by: 2019     Patient will increase functional independence with mobility by performin. Supine to sit with Moderate Assistance  2. Sit to stand transfer with Maximum Assistance PWB LLE  3. Bed to chair transfer with Maximum Assistance using Rolling Walker PWB LLE  4. Sitting at edge of bed x20 minutes with Minimal Assistance for static sitting  5. Lower extremity exercise program x20 reps   Outcome: Ongoing, Progressing   Pt continues to progress towards goals.

## 2019-11-05 NOTE — PLAN OF CARE
11/05/19 0835   Vibratory PEP Therapy   $ Vibratory PEP Charges Aerobika Therapy   $ Vibratory PEP Tech Time Charges 15 min

## 2019-11-05 NOTE — ASSESSMENT & PLAN NOTE
- HD TTS   - Nephrology consulted  - HD planned for today  - c/w home medications  - CM working on Chair time at [facility] (tentatively scheduled for TTS 11:30 a.m)  pending VA authorization

## 2019-11-05 NOTE — PT/OT/SLP PROGRESS
Physical Therapy Treatment    Patient Name:  Vinh Sanchez   MRN:  [id number]    Recommendations:     Discharge Recommendations:  nursing facility, skilled   Discharge Equipment Recommendations: (TBD at next level of care)   Barriers to discharge: None    Assessment:     Vinh Sanchez is a 89 y.o. male admitted with a medical diagnosis of Anemia.  He presents with the following impairments/functional limitations:  weakness, impaired endurance, impaired self care skills, impaired functional mobilty, gait instability, impaired balance, decreased lower extremity function, decreased safety awareness, orthopedic precautions. Pt continues to require max assist x 2 for supine > sit and max assist to maintain upright. Pt unable to engage core muscles to hold self upright. Pt unable to follow commands. Continue with PT and POC.    Rehab Prognosis: Fair; patient would benefit from acute skilled PT services to address these deficits and reach maximum level of function.    Recent Surgery: * No surgery found *      Plan:     During this hospitalization, patient to be seen 6 x/week to address the identified rehab impairments via therapeutic activities, therapeutic exercises and progress toward the following goals:    · Plan of Care Expires:  11/29/19    Subjective     Chief Complaint: No complaints  Patient/Family Comments/goals:   Pain/Comfort:  · Pain Rating 1: 0/10  · Pain Rating Post-Intervention 1: 0/10      Objective:     Communicated with nursing prior to session.  Patient found HOB elevated with telemetry upon PT entry to room.     General Precautions: Standard, fall   Orthopedic Precautions:LLE partial weight bearing   Braces:       Functional Mobility:  · Bed Mobility:     · Scooting: maximal assistance and of 2 persons  · Supine to Sit: maximal assistance and of 2 persons  · Sit to Supine: total assistance and of 2 persons      AM-PAC 6 CLICK MOBILITY          Therapeutic Activities and Exercises:   bed mobility; sitting  EOB for trunk control and midline orientation    Pt lean on R elbow with (max assist to maintain) to facilitate weightbearing through RUE.     Pt able to perform a few LAQs with BLEs with max encouragement but unable to complete exercise.    Patient left HOB elevated with all lines intact, call button in reach and pt's daughter present..    GOALS:   Multidisciplinary Problems     Physical Therapy Goals        Problem: Physical Therapy Goal    Goal Priority Disciplines Outcome Goal Variances Interventions   Physical Therapy Goal     PT, PT/OT Ongoing, Progressing     Description:  Goals to be met by: 2019     Patient will increase functional independence with mobility by performin. Supine to sit with Moderate Assistance  2. Sit to stand transfer with Maximum Assistance PWB LLE  3. Bed to chair transfer with Maximum Assistance using Rolling Walker PWB LLE  4. Sitting at edge of bed x20 minutes with Minimal Assistance for static sitting  5. Lower extremity exercise program x20 reps                    Time Tracking:     PT Received On: 19  PT Start Time: 1127     PT Stop Time: 1150  PT Total Time (min): 23 min     Billable Minutes: Therapeutic Activity 23    Treatment Type: Treatment        PTA Visit Number: Jack     Elba Durand PTA  2019

## 2019-11-05 NOTE — PLAN OF CARE
Problem: Skin Injury Risk Increased  Goal: Skin Health and Integrity  Outcome: Ongoing, Progressing     Problem: Oral Intake Inadequate  Goal: Improved Oral Intake  Outcome: Ongoing, Progressing       Recommendation/Intervention:   1.) Continue PO diet & supplements as tolerated by pt.   2.) Provided meal assist and encouragement all meals     Goals: 1.) Pt to meet at least 75% estimated needs via PO diet and supplements

## 2019-11-05 NOTE — ASSESSMENT & PLAN NOTE
S/p 2Units PRBC during HD on 11/1  H/H relatively stable  No signs of obvious bleeding; likely secondary to ESRD  Heparin for dvt ppx

## 2019-11-05 NOTE — SUBJECTIVE & OBJECTIVE
Interval History: afvss, plans for HD today. Family at bedside. Patient sleeping but arousable to voice.  Patient at baseline per family.  Patient on able are unwilling to answer questions    Review of Systems   Unable to perform ROS: Dementia        Objective:     Vital Signs (Most Recent):  Temp: 98.4 °F (36.9 °C) (11/05/19 1204)  Pulse: 64 (11/05/19 1204)  Resp: 18 (11/05/19 1204)  BP: 110/64 (11/05/19 1204)  SpO2: 96 % (11/04/19 2312) Vital Signs (24h Range):  Temp:  [98.2 °F (36.8 °C)-98.7 °F (37.1 °C)] 98.4 °F (36.9 °C)  Pulse:  [56-82] 64  Resp:  [16-18] 18  SpO2:  [93 %-96 %] 96 %  BP: (108-122)/(60-76) 110/64     Weight: 83.9 kg (184 lb 15.5 oz)  Body mass index is 25.09 kg/m².  No intake or output data in the 24 hours ending 11/05/19 1308   Physical Exam   Constitutional: No distress.   Elderly man sleeping in bed   HENT:   Head: Normocephalic and atraumatic.   Eyes: Pupils are equal, round, and reactive to light. Conjunctivae are normal.   Neck: Neck supple.   Cardiovascular: Normal rate and regular rhythm.   Pulmonary/Chest: Effort normal and breath sounds normal.   Abdominal: Soft. Bowel sounds are normal. He exhibits no distension. There is no tenderness.   Musculoskeletal: He exhibits no edema, tenderness or deformity.   Neurological: He has normal reflexes. He exhibits normal muscle tone.   Skin: Skin is warm and dry. He is not diaphoretic.   Nursing note and vitals reviewed.      Significant Labs:   CBC:   Recent Labs   Lab 11/04/19  0540 11/05/19  0435   WBC 11.79 8.19   HGB 9.1* 8.4*   HCT 29.5* 27.5*    319     CMP:   Recent Labs   Lab 11/04/19  0540 11/05/19  0435    136   K 4.6 4.9   CL 97 97   CO2 30* 27    107   BUN 50* 63*   CREATININE 7.3* 8.4*   CALCIUM 10.1 9.7   PROT 6.7 6.5   ALBUMIN 2.5* 2.5*   BILITOT 1.1* 0.8   ALKPHOS 72 72   AST 21 18   ALT 11 11   ANIONGAP 10 12   EGFRNONAA 5.9* 5.0*     All pertinent labs within the past 24 hours have been  reviewed.    Significant Imaging: no new images last 24 hours

## 2019-11-05 NOTE — PROGRESS NOTES
Formerly [facility], [facility] Medicine  Progress Note    Patient Name: Vinh Sanchez  MRN: [id number]  Patient Class: IP- Inpatient   Admission Date: 11/1/2019  Length of Stay: 4 days  Attending Physician: Simin Cesar DO  Primary Care Provider: Alysha Elena MD        Subjective:     Principal Problem:Anemia due to chronic kidney disease, on chronic dialysis  Chief Complaint   Patient presents with    Abnormal Lab     pt needs dialysis       HPI:   Mr. Sanchez is a  89 year old male who is brought to the ED by his family and reports that the patient needs dialysis. The patient has been a resident at the [facility] s/p surgery to Left hip after sustaining a fracture. He has ESRD on chronic HD. He is normally dialyzed by [facility] in [place] by Dr. Griffin, but since he is currently residing in [place], he has been seen by Dr. Simental and was to begin HD with [facility]. His last HD session was on this past Tuesday. When he went for his session at [facility] on yesterday, the family and the staff reportedly had a disagreement concerning the patient's dirty diaper, and the patient did not have the dialysis. The daughter reports she does not want her father to have dialysis at that facility. She called [facility] in [place] and was told to take the patient to the ED so he can be placed at [facility] in [place].  Lab work shows the patient is found to have Hyperkalemia at 5.8. H/H are found to be critically low at 6.2/20.  The patient has dementia and is unable to provide history. Family denies any other complaints. VSS. The patient does not appear to be in any distress. CXR with pulmonary vascular congetion. Dr. Motley is consulted and sees the patient in the ED. The patient will be admitted to have HD and 2 units of PRBC.     Overview/Hospital Course:  11/1: Admitted, Dialysis done x 1    Interval History: afvss, plans for HD today. Family at bedside. Patient sleeping but arousable to voice.  Patient at  baseline per family.  Patient on able are unwilling to answer questions    Review of Systems   Unable to perform ROS: Dementia        Objective:     Vital Signs (Most Recent):  Temp: 98.4 °F (36.9 °C) (11/05/19 1204)  Pulse: 64 (11/05/19 1204)  Resp: 18 (11/05/19 1204)  BP: 110/64 (11/05/19 1204)  SpO2: 96 % (11/04/19 2312) Vital Signs (24h Range):  Temp:  [98.2 °F (36.8 °C)-98.7 °F (37.1 °C)] 98.4 °F (36.9 °C)  Pulse:  [56-82] 64  Resp:  [16-18] 18  SpO2:  [93 %-96 %] 96 %  BP: (108-122)/(60-76) 110/64     Weight: 83.9 kg (184 lb 15.5 oz)  Body mass index is 25.09 kg/m².  No intake or output data in the 24 hours ending 11/05/19 1308   Physical Exam   Constitutional: No distress.   Elderly man sleeping in bed   HENT:   Head: Normocephalic and atraumatic.   Eyes: Pupils are equal, round, and reactive to light. Conjunctivae are normal.   Neck: Neck supple.   Cardiovascular: Normal rate and regular rhythm.   Pulmonary/Chest: Effort normal and breath sounds normal.   Abdominal: Soft. Bowel sounds are normal. He exhibits no distension. There is no tenderness.   Musculoskeletal: He exhibits no edema, tenderness or deformity.   Neurological: He has normal reflexes. He exhibits normal muscle tone.   Skin: Skin is warm and dry. He is not diaphoretic.   Nursing note and vitals reviewed.      Significant Labs:   CBC:   Recent Labs   Lab 11/04/19 0540 11/05/19 0435   WBC 11.79 8.19   HGB 9.1* 8.4*   HCT 29.5* 27.5*    319     CMP:   Recent Labs   Lab 11/04/19  0540 11/05/19 0435    136   K 4.6 4.9   CL 97 97   CO2 30* 27    107   BUN 50* 63*   CREATININE 7.3* 8.4*   CALCIUM 10.1 9.7   PROT 6.7 6.5   ALBUMIN 2.5* 2.5*   BILITOT 1.1* 0.8   ALKPHOS 72 72   AST 21 18   ALT 11 11   ANIONGAP 10 12   EGFRNONAA 5.9* 5.0*     All pertinent labs within the past 24 hours have been reviewed.    Significant Imaging: no new images last 24 hours      Assessment/Plan:      * Anemia due to chronic kidney disease, on  chronic dialysis  S/p 2Units PRBC during HD on 11/1  H/H relatively stable  No signs of obvious bleeding; likely secondary to ESRD  Heparin for dvt ppx    Osteoarthritis  Stable  C/w Celebrex      Hyperkalemia  Resolved 4.9 today  Treatment in the ED  HD per nephrology        Gout  C/w allopurinol      ESRD (end stage renal disease) on dialysis  - HD TTS   - Nephrology consulted  - HD planned for today  - c/w home medications  - CM working on Chair time at [facility] (tentatively scheduled for TTS 11:30 a.m)  pending [facility] authorization         VTE Risk Mitigation (From admission, onward)         Ordered     heparin (porcine) injection 5,000 Units  Every 8 hours      11/02/19 1823     IP VTE HIGH RISK PATIENT  Once      11/02/19 1823     Place sequential compression device  Until discontinued      11/01/19 2102     Place GRACIA hose  Until discontinued      11/01/19 2101     Reason for No Pharmacological VTE Prophylaxis  Once      11/01/19 1753                Simin Cesar DO  Department of Hospital Medicine   [facility]

## 2019-11-05 NOTE — PT/OT/SLP PROGRESS
Occupational Therapy      Patient Name:  Vinh Sanchez   MRN:  [id number]    Patient not seen today secondary to Dialysis. Will follow-up 11/6/2019.    Dinah Arriaza OT  11/5/2019

## 2019-11-05 NOTE — PROGRESS NOTES
[facility]  Adult Nutrition   Progress Note (Follow-Up)    SUMMARY     Recommendations/Interventions:    Recommendation/Intervention: 1.) Continue PO diet & supplements as tolerated by pt. 2.) Provided meal assist and encouragement all meals   Goals: 1.) Pt to meet at least 75% estimated needs via PO diet and supplements   Nutrition Goal Status: progressing towards goal  Communication of RD Recs: reviewed with RN    Reason for Assessment    Reason For Assessment: RD follow-up  Diagnosis: other (see comments)(anemia)  Relevant Medical History: ESRD on HD, NH resident, dementia     Nutrition Risk Screen    Nutrition Risk Screen: dysphagia or difficulty swallowing    Nutrition/Diet History    Patient Reported Diet/Restrictions/Preferences: renal  Food Allergies: NKFA  Factors Affecting Nutritional Intake: chewing difficulties/inability to chew food, inability to feed self    Anthropometrics    Temp: 98.2 °F (36.8 °C)  Height Method: Stated  Height: 6' (182.9 cm)  Height (inches): 72 in  Weight Method: Bed Scale  Weight: 83.9 kg (184 lb 15.5 oz)  Weight (lb): 184.97 lb  Ideal Body Weight (IBW), Male: 178 lb  % Ideal Body Weight, Male (lb): 103.93 lb  BMI (Calculated): 25.1  BMI Grade: 25 - 29.9 - overweight  Weight Loss: other (see comments)(family denies)       Weight History:  Wt Readings from Last 10 Encounters:   11/05/19 83.9 kg (184 lb 15.5 oz)   10/27/19 86.2 kg (190 lb 0.6 oz)     Lab/Procedures/Meds: Pertinent Labs Reviewed  Clinical Chemistry:  Recent Labs   Lab 11/01/19  1345  11/03/19  0608 11/04/19  0540 11/05/19  0435      < > 139 137 136   K 5.8*   < > 4.6 4.6 4.9      < > 99 97 97   CO2 25   < > 30* 30* 27   GLU 92   < > 102 103 107   BUN 62*   < > 40* 50* 63*   CREATININE 9.0*   < > 6.0* 7.3* 8.4*   CALCIUM 10.1   < > 10.1 10.1 9.7   PROT 6.6   < > 6.7 6.7 6.5   ALBUMIN 2.6*   < > 2.6* 2.5* 2.5*   BILITOT 0.9   < > 0.8 1.1* 0.8   ALKPHOS 75   < > 77 72 72   AST 22   < > 17 21  18   ALT 10   < > 10 11 11   ANIONGAP 11   < > 10 10 12   ESTGFRAFRICA 5.3*   < > 8.6* 6.8* 5.7*   EGFRNONAA 4.6*   < > 7.5* 5.9* 5.0*   MG 2.6   < > 2.2 2.4 2.4   PHOS  --    < > 4.1 4.2 4.5   AMYLASE 98  --   --   --   --    LIPASE 27  --   --   --   --     < > = values in this interval not displayed.     CBC:   Recent Labs   Lab 11/05/19  0435   WBC 8.19   RBC 2.66*   HGB 8.4*   HCT 27.5*      *   MCH 31.6*   MCHC 30.5*     Lipid Panel:  Recent Labs   Lab 11/02/19  0558   CHOL 169   HDL 35*   LDLCALC 100.8   TRIG 166*   CHOLHDL 20.7     Cardiac Profile:  Recent Labs   Lab 11/01/19  1345 11/02/19  0558   *  --    CPK 83  --    CPKMB 3.1  --    TROPONINI 0.088* 0.078*     Thyroid & Parathyroid:  Recent Labs   Lab 11/01/19  1345   TSH 2.210       Medications: Pertinent Medications reviewed  Scheduled Meds:   allopurinol  100 mg Oral Daily    cephALEXin  500 mg Oral Q12H    dextromethorphan-guaifenesin  mg  1 tablet Oral BID    epoetin fabi-ebpx (RETACRIT) injection  100 Units/kg Intravenous Every Tues, Thurs, Sat    famotidine  20 mg Oral Daily    heparin (porcine)  5,000 Units Subcutaneous Q8H    lactobacillus acidophilus & bulgar  1 packet Oral Daily    multivitamin  1 tablet Oral QHS    oxyCODONE-acetaminophen  1 tablet Oral Every Tues, Thurs, Sat    senna-docusate 8.6-50 mg  1 tablet Oral BID    sevelamer carbonate  2.4 g Oral TID WM    zinc oxide   Topical (Top) BID     Continuous Infusions:  PRN Meds:.sodium chloride, acetaminophen, ondansetron, ondansetron, oxyCODONE, sodium chloride 0.9%    Estimated/Assessed Needs    Weight Used For Calorie Calculations: 83.9 kg (184 lb 15.5 oz)  Energy Calorie Requirements (kcal): 8263-4785 (25-30 kcal/kg)  Energy Need Method: Kcal/kg  Protein Requirements: 100-126 g (1.2-1.5 g/kg)  Weight Used For Protein Calculations: 83.9 kg (184 lb 15.5 oz)     Estimated Fluid Requirement Method: RDA Method    Nutrition Prescription  Ordered    Current Diet Order: Renal; mechanical soft   Oral Nutrition Supplement: Nepro BID (850 kcal, 38 g pro)    Evaluation of Received Nutrient/Fluid Intake    Energy Calories Required: not meeting needs  Protein Required: not meeting needs  Fluid Required: meeting needs  Tolerance: tolerating    % Meal Intake: Other: 50% per family  No intake or output data in the 24 hours ending 11/05/19 1019   Nutrition Risk    Level of Risk/Frequency of Follow-up: moderate - high     Dietitian Rounds Brief:    Pt seen for f/u. Resting during visit; daughter at bedside. Nutrition care discussed with daughter. Reports intake remains fair. Pt experienced episode of emesis x 2.  Unsure of cause. Consuming Nepro. LBM 11/4 per chart.    Monitor and Evaluation    Food and Nutrient Intake: food and beverage intake, energy intake  Food and Nutrient Adminstration: diet order  Physical Activity and Function: nutrition-related ADLs and IADLs  Anthropometric Measurements: weight change, weight  Biochemical Data, Medical Tests and Procedures: gastrointestinal profile, electrolyte and renal panel, glucose/endocrine profile  Nutrition-Focused Physical Findings: overall appearance     Malnutrition Assessment    [place] (Muscle Loss): mild depletion   Edema (Fluid Accumulation): 0-->no edema present     Nutrition Follow-Up    RD Follow-up?: Yes     Sabiha Nolan RD 11/05/2019 10:19 AM

## 2019-11-05 NOTE — PROGRESS NOTES
INPATIENT NEPHROLOGY PROGRESS NOTE  [facility] NEPHROLOGY    Patient Name: Vinh Sanchez  Date: 11/05/2019    Reason for consultation: ESRD    Chief Complaint:   Chief Complaint   Patient presents with    Abnormal Lab     pt needs dialysis       History of Present Illness:  89 y/o M with hx of dementia and ESRD on HD TTS who was brought in by daughter for dialysis placement. Patient was previously dialyzed at [facility] by Dr. Griffin. He was recently hospitalized at [facility] and seen by Dr. Simental. He was discharged to [facility] and accepted to [facility]. He had HD at the hospital on Tues and went for his first HD treatment at [facility] yest. I spoke to the patient's daughter and HD staff today. Daughter said that patient had a dirty diaper and she thinks that is why unit did not want to see him. She said she spoke to Dr. Simental and she doesn't want her father to go to this unit. She couldn't really elaborate further but she was very upset and angry. She called [facility] and was told to take patient to the ER in order to get him placed at [facility]. The [facility] HD staff said patient was not scheduled to start yest. They did not have all of his information for intake. He is a large man who requires complete assistance for transfers. The patient did have a dirty diaper. It would have taken 4-5 staff members to try to get him to the bathroom to change him. They were also told by [facility] that patient is combative and feels like dialysis is a alf. They said they do not feel he is an appropriate candidate for their unit. My HD staff at the hospital contact HD staff at [facility] and was told patient will need restraints because he pulls out needles/lines. We are consulted for dialysis.    · Interval History/Subjective:    - calm, pleasant right now  - SBP , on RA  11/05  Seen today in dialysis.  BP a little low, pt is sleeping.    · Review of Systems: unable to obtain due to  dementia  ·   Plan of Care:    Assessment:  Dialysis placement  ESRD  HTN  SHPT  Anemia of CKD     Plan:     - Continue HD TTS with restraints. Awaiting PT/OT and SW/CM input. I have significant reservations about outpatient dialysis placement due to dementia and immobility from both an ethical as well as patient safety standpoint.   - BP/VS are stable. Continue 1-3L UF as tolerated.  - Phos is at goal. Continue binder.  - Hb is improved after blood transfusion this admission, remains stable over the last 2 days. Continue DENISE with HD TTS.    Updated nephew at bedside about my concerns.  He told me that he had to go to [facility] and help restrain patient several times due to agitation in order for him to get dialysis.     Thank you for allowing us to participate in this patient's care. We will continue to follow.    Medications:  No current facility-administered medications on file prior to encounter.      Current Outpatient Medications on File Prior to Encounter   Medication Sig Dispense Refill    allopurinol (ZYLOPRIM) 100 MG tablet Take 100 mg by mouth once daily.       famotidine (PEPCID) 20 MG tablet Take 1 tablet (20 mg total) by mouth once daily. 30 tablet 1    heparin sodium,porcine (HEPARIN, PORCINE,) 5,000 unit/mL injection Inject 1 mL (5,000 Units total) into the skin every 8 (eight) hours. for 14 days      Lactobacillus rhamnosus GG (CULTERELLE) 5 billion cell PwPk packet Take 1 packet (1 each total) by mouth once daily.      meloxicam (MOBIC) 15 MG tablet Take 15 mg by mouth once daily.      oxyCODONE-acetaminophen (PERCOCET)  mg per tablet Take 1 tablet by mouth every Tues, Thurs, Sat. For pain in route to dialysis      sevelamer carbonate (RENVELA) 2.4 gram PwPk Take 2.4 g by mouth 3 (three) times daily with meals. 216 g 11    vit b cmplx 3-fa-vit c-biotin 1- mg-mg-mcg (NEPHRO-FOUZIA RX OR EQUIV) 1- mg-mg-mcg Tab Take 1 tablet by mouth nightly.  0    acetaminophen (TYLENOL) 325 MG  tablet Take 2 tablets (650 mg total) by mouth every 8 (eight) hours as needed.  0    menthol-zinc oxide 0.2-20% 0.2-20 % Pste paste Apply topically 2 (two) times daily.      ondansetron (ZOFRAN-ODT) 8 MG TbDL Take 1 tablet (8 mg total) by mouth every 8 (eight) hours as needed.      oxyCODONE (ROXICODONE) 5 MG immediate release tablet Take 1 tablet (5 mg total) by mouth every 4 (four) hours as needed for Pain. 20 tablet 0     Scheduled Meds:   allopurinol  100 mg Oral Daily    cephALEXin  500 mg Oral Q12H    dextromethorphan-guaifenesin  mg  1 tablet Oral BID    epoetin fabi-ebpx (RETACRIT) injection  100 Units/kg Intravenous Every Tues, Thurs, Sat    famotidine  20 mg Oral Daily    heparin (porcine)  5,000 Units Subcutaneous Q8H    lactobacillus acidophilus & bulgar  1 packet Oral Daily    multivitamin  1 tablet Oral QHS    oxyCODONE-acetaminophen  1 tablet Oral Every Tues, Thurs, Sat    senna-docusate 8.6-50 mg  1 tablet Oral BID    sevelamer carbonate  2.4 g Oral TID WM    zinc oxide   Topical (Top) BID     Continuous Infusions:  PRN Meds:.sodium chloride, acetaminophen, ondansetron, ondansetron, oxyCODONE, sodium chloride 0.9%    Allergies:  Dilaudid [hydromorphone] and Haldol [haloperidol lactate]    Vital Signs:  Temp Readings from Last 3 Encounters:   11/05/19 98.4 °F (36.9 °C) (Oral)   10/30/19 98.1 °F (36.7 °C)       Pulse Readings from Last 3 Encounters:   11/05/19 64   10/30/19 66       BP Readings from Last 3 Encounters:   11/05/19 110/64   10/30/19 (!) 98/50       Weight:  Wt Readings from Last 3 Encounters:   11/05/19 83.9 kg (184 lb 15.5 oz)   10/27/19 86.2 kg (190 lb 0.6 oz)       Physical Exam:  Constitutional: nad, aao x 1  Heart: rrr, no m/r/g, wwp, no edema  Lungs: ctab, no w/r/r/c, no lb  Abdomen: s/nt/nd, +BS    Results:  Lab Results   Component Value Date     11/05/2019    K 4.9 11/05/2019    CL 97 11/05/2019    CO2 27 11/05/2019    BUN 63 (H) 11/05/2019    CREATININE  8.4 (H) 11/05/2019    CALCIUM 9.7 11/05/2019    ANIONGAP 12 11/05/2019    ESTGFRAFRICA 5.7 (A) 11/05/2019    EGFRNONAA 5.0 (A) 11/05/2019       Lab Results   Component Value Date    CALCIUM 9.7 11/05/2019    PHOS 4.5 11/05/2019       Recent Labs   Lab 11/05/19  0435   WBC 8.19   RBC 2.66*   HGB 8.4*   HCT 27.5*      *   MCH 31.6*   MCHC 30.5*       I have personally reviewed pertinent radiological imaging and reports.    I have spent > 35 minutes providing care for this patient for the above diagnoses. These services have included chart/data/imaging review, evaluation, exam, formulation of plan, , note preparation, and discussions with staff involved in this patient's care.    Conrad Deleon MD MPH  [facility] [address] [address]  [phone] (p)  [phone] (f)

## 2019-11-06 NOTE — PLAN OF CARE
Problem: Adult Inpatient Plan of Care  Goal: Plan of Care Review  Outcome: Ongoing, Progressing  Goal: Patient-Specific Goal (Individualization)  Outcome: Ongoing, Progressing  Goal: Absence of Hospital-Acquired Illness or Injury  Outcome: Ongoing, Progressing  Goal: Optimal Comfort and Wellbeing  Outcome: Ongoing, Progressing  Goal: Readiness for Transition of Care  Outcome: Ongoing, Progressing  Goal: Rounds/Family Conference  Outcome: Ongoing, Progressing     Problem: Fall Injury Risk  Goal: Absence of Fall and Fall-Related Injury  Outcome: Ongoing, Progressing     Problem: Restraint, Nonbehavioral (Nonviolent)  Goal: Discontinuation Criteria Achieved  Outcome: Ongoing, Progressing  Goal: Personal Dignity and Safety Maintained  Outcome: Ongoing, Progressing     Problem: Device-Related Complication Risk (Hemodialysis)  Goal: Safe, Effective Therapy Delivery  Outcome: Ongoing, Progressing     Problem: Hemodynamic Instability (Hemodialysis)  Goal: Vital Signs Remain in Desired Range  Outcome: Ongoing, Progressing     Problem: Infection (Hemodialysis)  Goal: Absence of Infection Signs/Symptoms  Outcome: Ongoing, Progressing     Problem: Skin Injury Risk Increased  Goal: Skin Health and Integrity  Outcome: Ongoing, Progressing     Problem: Oral Intake Inadequate  Goal: Improved Oral Intake  Outcome: Ongoing, Progressing      Problem: Postpartum  Goal: Demonstrates positive reciprocal attachment with infant  Outcome: Outcome Met, Continue evaluating goal progress toward completion  Pt demonstrates positive reciprocal attachment with infant threw cares and feedings.

## 2019-11-06 NOTE — PROGRESS NOTES
INPATIENT NEPHROLOGY PROGRESS NOTE  [facility] NEPHROLOGY    Patient Name: Vinh Sanchez  Date: 11/06/2019    Reason for consultation: ESRD    Chief Complaint:   Chief Complaint   Patient presents with    Abnormal Lab     pt needs dialysis       History of Present Illness:  89 y/o M with hx of dementia and ESRD on HD TTS who was brought in by daughter for dialysis placement. Patient was previously dialyzed at [facility] by Dr. Griffin. He was recently hospitalized at [facility] and seen by Dr. Simental. He was discharged to [facility] and accepted to [facility]. He had HD at the hospital on Tues and went for his first HD treatment at [facility] yest. I spoke to the patient's daughter and HD staff today. Daughter said that patient had a dirty diaper and she thinks that is why unit did not want to see him. She said she spoke to Dr. Simental and she doesn't want her father to go to this unit. She couldn't really elaborate further but she was very upset and angry. She called [facility] and was told to take patient to the ER in order to get him placed at [facility]. The [facility] HD staff said patient was not scheduled to start yest. They did not have all of his information for intake. He is a large man who requires complete assistance for transfers. The patient did have a dirty diaper. It would have taken 4-5 staff members to try to get him to the bathroom to change him. They were also told by [facility] that patient is combative and feels like dialysis is a custodial. They said they do not feel he is an appropriate candidate for their unit. My HD staff at the hospital contact HD staff at [facility] and was told patient will need restraints because he pulls out needles/lines. We are consulted for dialysis.    · Interval History/Subjective:    - febrile yest- SBP 90-110s    · Review of Systems: unable to obtain due to dementia  ·   Plan of Care:    Assessment:  Dialysis placement  ESRD  HTN  SHPT  Anemia of  CKD  Fever     Plan:     - He has been declined from [facility] (see note from 11/5). I spoke to daughter at bedside- they are working on LTAC in [place]. This is ideal.  - Continue HD TTS while inpatient. Continue restraints for safety during HD only.  - BP was low during HD yest- remains low. We did UF even.  - Phos is at goal. Continue binder.  - Hb is stable. Bump DENISE to 150 units/kg with HD TTS.  - Ordered blood cx, UA and UCx.    Thank you for allowing us to participate in this patient's care. We will continue to follow.    Medications:  No current facility-administered medications on file prior to encounter.      Current Outpatient Medications on File Prior to Encounter   Medication Sig Dispense Refill    allopurinol (ZYLOPRIM) 100 MG tablet Take 100 mg by mouth once daily.       famotidine (PEPCID) 20 MG tablet Take 1 tablet (20 mg total) by mouth once daily. 30 tablet 1    heparin sodium,porcine (HEPARIN, PORCINE,) 5,000 unit/mL injection Inject 1 mL (5,000 Units total) into the skin every 8 (eight) hours. for 14 days      Lactobacillus rhamnosus GG (CULTERELLE) 5 billion cell PwPk packet Take 1 packet (1 each total) by mouth once daily.      meloxicam (MOBIC) 15 MG tablet Take 15 mg by mouth once daily.      oxyCODONE-acetaminophen (PERCOCET)  mg per tablet Take 1 tablet by mouth every Tues, Thurs, Sat. For pain in route to dialysis      sevelamer carbonate (RENVELA) 2.4 gram PwPk Take 2.4 g by mouth 3 (three) times daily with meals. 216 g 11    vit b cmplx 3-fa-vit c-biotin 1- mg-mg-mcg (NEPHRO-FOUZIA RX OR EQUIV) 1- mg-mg-mcg Tab Take 1 tablet by mouth nightly.  0    acetaminophen (TYLENOL) 325 MG tablet Take 2 tablets (650 mg total) by mouth every 8 (eight) hours as needed.  0    menthol-zinc oxide 0.2-20% 0.2-20 % Pste paste Apply topically 2 (two) times daily.      ondansetron (ZOFRAN-ODT) 8 MG TbDL Take 1 tablet (8 mg total) by mouth every 8 (eight) hours as needed.       oxyCODONE (ROXICODONE) 5 MG immediate release tablet Take 1 tablet (5 mg total) by mouth every 4 (four) hours as needed for Pain. 20 tablet 0     Scheduled Meds:   allopurinol  100 mg Oral Daily    cephALEXin  500 mg Oral Q12H    dextromethorphan-guaifenesin  mg  1 tablet Oral BID    epoetin fabi-ebpx (RETACRIT) injection  100 Units/kg Intravenous Every Tues, Thurs, Sat    famotidine  20 mg Oral Daily    heparin (porcine)  5,000 Units Subcutaneous Q8H    lactobacillus acidophilus & bulgar  1 packet Oral Daily    multivitamin  1 tablet Oral QHS    oxyCODONE-acetaminophen  1 tablet Oral Every Tues, Thurs, Sat    senna-docusate 8.6-50 mg  1 tablet Oral BID    sevelamer carbonate  2.4 g Oral TID WM    zinc oxide   Topical (Top) BID     Continuous Infusions:  PRN Meds:.sodium chloride, acetaminophen, ondansetron, ondansetron, oxyCODONE, sodium chloride 0.9%    Allergies:  Dilaudid [hydromorphone] and Haldol [haloperidol lactate]    Vital Signs:  Temp Readings from Last 3 Encounters:   11/06/19 98.2 °F (36.8 °C) (Oral)   10/30/19 98.1 °F (36.7 °C)       Pulse Readings from Last 3 Encounters:   11/06/19 85   10/30/19 66       BP Readings from Last 3 Encounters:   11/06/19 99/63   10/30/19 (!) 98/50       Weight:  Wt Readings from Last 3 Encounters:   11/05/19 83.9 kg (184 lb 15.5 oz)   10/27/19 86.2 kg (190 lb 0.6 oz)       Physical Exam:  Constitutional: nad, sleeping, nontoxic, appears stated age  Heart: rrr, no m/r/g, wwp, no edema  Lungs: ctab, no w/r/r/c, no lb  Abdomen: s/nt/nd, +BS    Results:  Lab Results   Component Value Date     (L) 11/06/2019    K 4.7 11/06/2019    CL 93 (L) 11/06/2019    CO2 29 11/06/2019    BUN 41 (H) 11/06/2019    CREATININE 6.5 (H) 11/06/2019    CALCIUM 9.4 11/06/2019    ANIONGAP 9 11/06/2019    ESTGFRAFRICA 7.8 (A) 11/06/2019    EGFRNONAA 6.8 (A) 11/06/2019       Lab Results   Component Value Date    CALCIUM 9.4 11/06/2019    PHOS 4.1 11/06/2019       Recent Labs    Lab 11/06/19  0650   WBC 16.19*   RBC 2.61*   HGB 8.4*   HCT 27.1*      *   MCH 32.2*   MCHC 31.0*       I have personally reviewed pertinent radiological imaging and reports.    I have spent > 35 minutes providing care for this patient for the above diagnoses. These services have included chart/data/imaging review, evaluation, exam, formulation of plan, , note preparation, and discussions with staff involved in this patient's care.    Conrad Deleon MD MPH  [facility]  [address] [place] [id number]  [phone] (p)  [phone] (f)

## 2019-11-06 NOTE — NURSING
"Patient sleeping during most of shift but arousable to voice. Family members refused some of the patient's night meds and also vitals. Daughter states " he needs to sleep". Free of falls , will continue to monitor.   "

## 2019-11-06 NOTE — PLAN OF CARE
Problem: Physical Therapy Goal  Goal: Physical Therapy Goal  Description  Goals to be met by: 2019     Patient will increase functional independence with mobility by performin. Supine to sit with Moderate Assistance  2. Sit to stand transfer with Maximum Assistance PWB LLE  3. Bed to chair transfer with Maximum Assistance using Rolling Walker PWB LLE  4. Sitting at edge of bed x20 minutes with Minimal Assistance for static sitting  5. Lower extremity exercise program x20 reps   Outcome: Ongoing, Progressing  Cont working on goals to progress level of function

## 2019-11-06 NOTE — NURSING
Refused morning vital signs. Pt educated on importance of vitals signs routinely but still refused.

## 2019-11-06 NOTE — DISCHARGE SUMMARY
[facility]  Discharge Summary      Patient Name: Vinh Sanchez  MRN: [id number]  Admission Date: 11/1/2019  Hospital Length of Stay: 5 days  Discharge Date and Time:  11/06/2019 8:35 PM  Attending Physician: Simin Cesar DO   Discharging Provider: Simin Cesar DO  Primary Care Provider: Alysha Elena MD      HPI:    Mr. Sanchez is a  89 year old male who is brought to the ED by his family and reports that the patient needs dialysis. The patient has been a resident at the [facility] s/p surgery to Left hip after sustaining a fracture. He has ESRD on chronic HD. He is normally dialyzed by [facility] in [place] by Dr. Griffin, but since he is currently residing in [place], he has been seen by Dr. Simental and was to begin HD with [facility]. His last HD session was on this past Tuesday. When he went for his session at [facility] on yesterday, the family and the staff reportedly had a disagreement concerning the patient's dirty diaper, and the patient did not have the dialysis. The daughter reports she does not want her father to have dialysis at that facility. She called [facility] in [place] and was told to take the patient to the ED so he can be placed at [facility] in [place].  Lab work shows the patient is found to have Hyperkalemia at 5.8. H/H are found to be critically low at 6.2/20.  The patient has dementia and is unable to provide history. Family denies any other complaints. VSS. The patient does not appear to be in any distress. CXR with pulmonary vascular congetion. Dr. Motley is consulted and sees the patient in the ED. The patient will be admitted to have HD and 2 units of PRBC.     * No surgery found *      Hospital Course:   Chief 89-year-old male with history of dementia and ESRD on HD TTS admitted for dialysis needs.  Patient was initially hyperkalemic which resolved following dialysis.  He also required blood transfusion.  Discharge was delayed as patient  required new dialysis center and chair time.  Following discussion with family Nephrology it was decided that patient was not candidate for DVT a secondary to worsening dementia symptoms that started following surgery and anesthesia several weeks ago.  It was recommended that patient go to LTAC for ongoing care.  Case management consulted for assistance with placement.  Patient seen and examined on day of discharge in appropriate for discharge to LTAC.    Patient seen and examined on day of discharge  BP (!) 115/54   Pulse 93   Temp 98.1 °F (36.7 °C) (Oral)   Resp 18   Ht 6' (1.829 m)   Wt 83.9 kg (184 lb 15.5 oz)   SpO2 (!) 90%   BMI 25.09 kg/m²   Gen: sleeping but wakes to voice  ENT MMM  CV: RRR no mrg  CT ctab  Ab: soft, ntnd, +bs    Consults:   Consults (From admission, onward)        Status Ordering Provider     Inpatient consult to Hospitalist  Once     Provider:  Cheyenne Singh MD    Acknowledged JAGDEEP MALAGON     Inpatient consult to Nephrology  Once     Provider:  Arnel Bruner MD    Acknowledged NELY GONZALEZ     Inpatient consult to Registered Dietitian/Nutritionist  Once     Provider:  (Not yet assigned)    Completed CHEYENNE SINGH     Inpatient consult to Social Work/Case Management  Once     Provider:  (Not yet assigned)    Acknowledged ALL FANG          Osteoarthritis  Celecoxib 400mg started and will continue  Stable         Final Active Diagnoses:    Diagnosis Date Noted POA    PRINCIPAL PROBLEM:  Anemia due to chronic kidney disease, on chronic dialysis [N18.6, D63.1, Z99.2] 11/01/2019 Not Applicable    Osteoarthritis [M19.90] 11/05/2019 Yes    Hyperkalemia [E87.5] 11/01/2019 Yes    Dementia without behavioral disturbance [F03.90] 10/19/2019 Yes    Gout [M10.9] 10/19/2019 Yes    ESRD (end stage renal disease) on dialysis [N18.6, Z99.2] 10/16/2019 Not Applicable      Problems Resolved During this Admission:       Discharged Condition:  stable    Disposition: Long Term Care    Follow Up:  Follow-up Information     Alysha Elena MD.    Specialty:  Internal Medicine  Why:  As needed  Contact information:  [address]  [place] [address] [phone]                 Patient Instructions:      Diet Adult Regular     Activity as tolerated       Significant Diagnostic Studies: Labs:   BMP:   Recent Labs   Lab 11/05/19  0435 11/06/19  0650    114*    131*   K 4.9 4.7   CL 97 93*   CO2 27 29   BUN 63* 41*   CREATININE 8.4* 6.5*   CALCIUM 9.7 9.4   MG 2.4 2.1    and All labs within the past 24 hours have been reviewed    Pending Diagnostic Studies:     None         Medications:  Reconciled Home Medications:      Medication List      START taking these medications    epoetin fabi-epbx 10,000 unit/mL imjection  Commonly known as:  RETACRIT  Inject 1.26 mLs (12,600 Units total) into the skin every Tues, Thurs, Sat.  Start taking on:  November 7, 2019     lactobacillus acidophilus & bulgar 100 million cell packet  Commonly known as:  FLORANEX  Take 1 packet (1 each total) by mouth once daily.  Start taking on:  November 7, 2019        CONTINUE taking these medications    acetaminophen 325 MG tablet  Commonly known as:  TYLENOL  Take 2 tablets (650 mg total) by mouth every 8 (eight) hours as needed.     allopurinol 100 MG tablet  Commonly known as:  ZYLOPRIM  Take 100 mg by mouth once daily.     famotidine 20 MG tablet  Commonly known as:  PEPCID  Take 1 tablet (20 mg total) by mouth once daily.     heparin (porcine) 5,000 unit/mL injection  Inject 1 mL (5,000 Units total) into the skin every 8 (eight) hours. for 14 days     Lactobacillus rhamnosus GG 5 billion cell Pwpk packet  Commonly known as:  CULTERELLE  Take 1 packet (1 each total) by mouth once daily.     menthol-zinc oxide 0.2-20% 0.2-20 % Pste paste  Apply topically 2 (two) times daily.     ondansetron 8 MG Tbdl  Commonly known as:  ZOFRAN-ODT  Take 1 tablet (8 mg total) by  mouth every 8 (eight) hours as needed.     oxyCODONE 5 MG immediate release tablet  Commonly known as:  ROXICODONE  Take 1 tablet (5 mg total) by mouth every 4 (four) hours as needed for Pain.     oxyCODONE-acetaminophen  mg per tablet  Commonly known as:  PERCOCET  Take 1 tablet by mouth every Tues, Thurs, Sat. For pain in route to dialysis     sevelamer carbonate 2.4 gram Pwpk  Commonly known as:  RENVELA  Take 2.4 g by mouth 3 (three) times daily with meals.     vit b cmplx 3-fa-vit c-biotin 1- mg-mg-mcg 1- mg-mg-mcg Tab  Commonly known as:  Nephro-Ander RX or Equiv  Take 1 tablet by mouth nightly.        STOP taking these medications    meloxicam 15 MG tablet  Commonly known as:  MOBIC        ASK your doctor about these medications    cephALEXin 500 MG capsule  Commonly known as:  KEFLEX  Take 1 capsule (500 mg total) by mouth every 12 (twelve) hours. for 4 days  Ask about: Should I take this medication?            Indwelling Lines/Drains at time of discharge:   Lines/Drains/Airways     Drain                 Hemodialysis AV Fistula Left upper arm -- days                Time spent on the discharge of patient: 35 minutes  Patient was seen and examined on the date of discharge and determined to be suitable for discharge.         Simin Cesar DO  Department of Hospital Medicine  [facility]

## 2019-11-06 NOTE — PT/OT/SLP PROGRESS
Physical Therapy Treatment    Patient Name:  Vinh Sanchez   MRN:  [id number]    Recommendations:     Discharge Recommendations:  nursing facility, skilled   Discharge Equipment Recommendations: wheelchair   Barriers to discharge: None    Assessment:     Vinh Sanchez is a 89 y.o. male admitted with a medical diagnosis of Anemia due to chronic kidney disease, on chronic dialysis.  He presents with the following impairments/functional limitations:  weakness, impaired functional mobilty, impaired cognition, impaired endurance, gait instability, impaired self care skills, decreased ROM. Pt extremely lethargic and unable to maintain level of alertness throughout treatment  Rehab Prognosis: Fair; patient would benefit from acute skilled PT services to address these deficits and reach maximum level of function.    Recent Surgery: * No surgery found *      Plan:     During this hospitalization, patient to be seen 6 x/week to address the identified rehab impairments via gait training, therapeutic activities, therapeutic exercises and progress toward the following goals:    · Plan of Care Expires:  11/29/19    Subjective     Chief Complaint: pt did not maintain alertness throughout treatment. Does not follow commands  Patient/Family Comments/goals: pt's daughter wants him to get better  Pain/Comfort:  ·  Unable to state      Objective:     Communicated with nurse prior to session.  Patient found supine with bed alarm, SCD, peripheral IV upon PT entry to room.     General Precautions: Standard, fall   Orthopedic Precautions:LLE partial weight bearing   Braces:         AM-PAC 6 CLICK MOBILITY  Turning over in bed (including adjusting bedclothes, sheets and blankets)?: 2  Sitting down on and standing up from a chair with arms (e.g., wheelchair, bedside commode, etc.): 2  Moving from lying on back to sitting on the side of the bed?: 2  Moving to and from a bed to a chair (including a wheelchair)?: 2  Need to walk in hospital room?:  1  Climbing 3-5 steps with a railing?: 1  Basic Mobility Total Score: 10       Therapeutic Activities and Exercises:   performed PROM x 10 reps to all extremities in all available planes. Pt grimaces when B LE are moved. Unable to achieve full flex of hip and knee in B LE's due to pain    Patient left supine with all lines intact, call button in reach and daughter present..    GOALS:   Multidisciplinary Problems     Physical Therapy Goals        Problem: Physical Therapy Goal    Goal Priority Disciplines Outcome Goal Variances Interventions   Physical Therapy Goal     PT, PT/OT Ongoing, Progressing     Description:  Goals to be met by: 2019     Patient will increase functional independence with mobility by performin. Supine to sit with Moderate Assistance  2. Sit to stand transfer with Maximum Assistance PWB LLE  3. Bed to chair transfer with Maximum Assistance using Rolling Walker PWB LLE  4. Sitting at edge of bed x20 minutes with Minimal Assistance for static sitting  5. Lower extremity exercise program x20 reps                    Time Tracking:     PT Received On: 19  PT Start Time: 1120     PT Stop Time: 1135  PT Total Time (min): 15 min     Billable Minutes: Therapeutic Activity 15 min    Treatment Type: Treatment  PT/PTA: PT     PTA Visit Number: 0     Aaliyah Raman, PT  2019

## 2019-11-06 NOTE — NURSING
Patients daughter agreeable to transfer of patient to LTAC today.  Waiting for information on who to call report to.

## 2019-11-06 NOTE — PLAN OF CARE
11/05/19 2049   PRE-TX-O2   O2 Device (Oxygen Therapy) room air   SpO2 96 %   Pulse Oximetry Type Intermittent   $ Pulse Oximetry - Multiple Charge Pulse Oximetry - Multiple   Pulse 91   Resp 16   Vibratory PEP Therapy   $ Vibratory PEP Charges   (patient unable to do aerobika/acapella won't wake up enough)

## 2019-11-07 NOTE — NURSING
Patient discharged with [facility], daughter  at bedside, patient had a bowel movement prior to discharge. Vitals stable

## 2019-11-11 PROBLEM — K92.2 GASTROINTESTINAL HEMORRHAGE: Status: ACTIVE | Noted: 2019-01-01

## 2019-11-11 NOTE — PT/OT/SLP DISCHARGE
Occupational Therapy Discharge Summary    Vinh Sanchez  MRN: [id number]   Principal Problem: Anemia due to chronic kidney disease, on chronic dialysis      Patient Discharged from acute Occupational Therapy on 11/6/2019.  Please refer to prior OT note dated 11/4/2019 for functional status.    Assessment:      Patient appropriate for care in another setting.    Objective:     GOALS:   Multidisciplinary Problems     Occupational Therapy Goals     Not on file          Multidisciplinary Problems (Resolved)        Problem: Occupational Therapy Goal    Goal Priority Disciplines Outcome Interventions   Occupational Therapy Goal   (Resolved)     OT, PT/OT Met    Description:  Goals to be met by: discharge     Patient will increase functional independence with ADLs by performing:    Grooming while EOB with Minimal Assistance.  Sitting at edge of bed x15 minutes with Minimal Assistance.  Supine to sit with Moderate Assistance.  Toilet transfer to bedside commode with Maximum Assistance with RW while maintaining LLE precautions.                      Reasons for Discontinuation of Therapy Services  Transfer to alternate level of care.      Plan:     Patient Discharged to: Skilled Nursing Facility    Bonilla Mercer, OT  11/11/2019

## 2019-11-15 PROBLEM — Z51.5 COMFORT MEASURES ONLY STATUS: Status: ACTIVE | Noted: 2019-01-01

## 2019-11-18 PROBLEM — N18.6 ESRF (END STAGE RENAL FAILURE): Status: ACTIVE | Noted: 2019-01-01

## 2019-11-18 PROBLEM — R57.8 HEMORRHAGIC SHOCK: Status: ACTIVE | Noted: 2019-01-01
